# Patient Record
Sex: FEMALE | Race: WHITE | NOT HISPANIC OR LATINO | Employment: UNEMPLOYED | URBAN - NONMETROPOLITAN AREA
[De-identification: names, ages, dates, MRNs, and addresses within clinical notes are randomized per-mention and may not be internally consistent; named-entity substitution may affect disease eponyms.]

---

## 2019-03-21 ENCOUNTER — APPOINTMENT (OUTPATIENT)
Dept: GENERAL RADIOLOGY | Facility: HOSPITAL | Age: 74
End: 2019-03-21

## 2019-03-21 ENCOUNTER — APPOINTMENT (OUTPATIENT)
Dept: CT IMAGING | Facility: HOSPITAL | Age: 74
End: 2019-03-21

## 2019-03-21 ENCOUNTER — HOSPITAL ENCOUNTER (EMERGENCY)
Facility: HOSPITAL | Age: 74
Discharge: HOME | End: 2019-03-21
Attending: EMERGENCY MEDICINE | Admitting: EMERGENCY MEDICINE

## 2019-03-21 VITALS
RESPIRATION RATE: 16 BRPM | DIASTOLIC BLOOD PRESSURE: 62 MMHG | WEIGHT: 168 LBS | OXYGEN SATURATION: 96 % | HEIGHT: 63 IN | SYSTOLIC BLOOD PRESSURE: 103 MMHG | TEMPERATURE: 97.4 F | HEART RATE: 79 BPM | BODY MASS INDEX: 29.77 KG/M2

## 2019-03-21 DIAGNOSIS — N39.0 ACUTE LOWER UTI (URINARY TRACT INFECTION): ICD-10-CM

## 2019-03-21 DIAGNOSIS — I95.1 SYNCOPE DUE TO ORTHOSTATIC HYPOTENSION: Primary | ICD-10-CM

## 2019-03-21 LAB
ALBUMIN SERPL-MCNC: 4 G/DL (ref 3.5–5)
ALBUMIN/GLOB SERPL: 1.6 G/DL (ref 1–2)
ALP SERPL-CCNC: 49 U/L (ref 38–126)
ALT SERPL W P-5'-P-CCNC: 24 U/L (ref 13–69)
ANION GAP SERPL CALCULATED.3IONS-SCNC: 10.9 MMOL/L (ref 10–20)
AST SERPL-CCNC: 19 U/L (ref 15–46)
BACTERIA UR QL AUTO: ABNORMAL /HPF
BASOPHILS # BLD AUTO: 0.07 10*3/MM3 (ref 0–0.2)
BASOPHILS NFR BLD AUTO: 1 % (ref 0–2.5)
BILIRUB SERPL-MCNC: 0.3 MG/DL (ref 0.2–1.3)
BILIRUB UR QL STRIP: NEGATIVE
BUN BLD-MCNC: 22 MG/DL (ref 7–20)
BUN/CREAT SERPL: 12.2 (ref 7.1–23.5)
CALCIUM SPEC-SCNC: 9.3 MG/DL (ref 8.4–10.2)
CHLORIDE SERPL-SCNC: 100 MMOL/L (ref 98–107)
CLARITY UR: CLEAR
CO2 SERPL-SCNC: 27 MMOL/L (ref 26–30)
COLOR UR: YELLOW
CREAT BLD-MCNC: 1.8 MG/DL (ref 0.6–1.3)
DEPRECATED RDW RBC AUTO: 47.5 FL (ref 37–54)
EOSINOPHIL # BLD AUTO: 0.26 10*3/MM3 (ref 0–0.7)
EOSINOPHIL NFR BLD AUTO: 3.8 % (ref 0–7)
ERYTHROCYTE [DISTWIDTH] IN BLOOD BY AUTOMATED COUNT: 13.6 % (ref 11.5–14.5)
GFR SERPL CREATININE-BSD FRML MDRD: 28 ML/MIN/1.73
GLOBULIN UR ELPH-MCNC: 2.5 GM/DL
GLUCOSE BLD-MCNC: 157 MG/DL (ref 74–98)
GLUCOSE UR STRIP-MCNC: NEGATIVE MG/DL
HCT VFR BLD AUTO: 39.8 % (ref 37–47)
HGB BLD-MCNC: 13.1 G/DL (ref 12–16)
HGB UR QL STRIP.AUTO: NEGATIVE
HYALINE CASTS UR QL AUTO: ABNORMAL /LPF
IMM GRANULOCYTES # BLD AUTO: 0.04 10*3/MM3 (ref 0–0.06)
IMM GRANULOCYTES NFR BLD AUTO: 0.6 % (ref 0–0.6)
KETONES UR QL STRIP: NEGATIVE
LEUKOCYTE ESTERASE UR QL STRIP.AUTO: ABNORMAL
LIPASE SERPL-CCNC: 188 U/L (ref 23–300)
LYMPHOCYTES # BLD AUTO: 1.12 10*3/MM3 (ref 0.6–3.4)
LYMPHOCYTES NFR BLD AUTO: 16.5 % (ref 10–50)
MCH RBC QN AUTO: 31.2 PG (ref 27–31)
MCHC RBC AUTO-ENTMCNC: 32.9 G/DL (ref 30–37)
MCV RBC AUTO: 94.8 FL (ref 81–99)
MONOCYTES # BLD AUTO: 0.71 10*3/MM3 (ref 0–0.9)
MONOCYTES NFR BLD AUTO: 10.5 % (ref 0–12)
NEUTROPHILS # BLD AUTO: 4.58 10*3/MM3 (ref 2–6.9)
NEUTROPHILS NFR BLD AUTO: 67.6 % (ref 37–80)
NITRITE UR QL STRIP: NEGATIVE
NRBC BLD AUTO-RTO: 0 /100 WBC (ref 0–0)
PH UR STRIP.AUTO: 5.5 [PH] (ref 5–8)
PLATELET # BLD AUTO: 226 10*3/MM3 (ref 130–400)
PMV BLD AUTO: 9.2 FL (ref 6–12)
POTASSIUM BLD-SCNC: 3.9 MMOL/L (ref 3.5–5.1)
PROT SERPL-MCNC: 6.5 G/DL (ref 6.3–8.2)
PROT UR QL STRIP: NEGATIVE
RBC # BLD AUTO: 4.2 10*6/MM3 (ref 4.2–5.4)
RBC # UR: ABNORMAL /HPF
REF LAB TEST METHOD: ABNORMAL
SODIUM BLD-SCNC: 134 MMOL/L (ref 137–145)
SP GR UR STRIP: 1.02 (ref 1–1.03)
SQUAMOUS #/AREA URNS HPF: ABNORMAL /HPF
TROPONIN I SERPL-MCNC: <0.012 NG/ML (ref 0–0.03)
TSH SERPL DL<=0.05 MIU/L-ACNC: 5.55 MIU/ML (ref 0.47–4.68)
UROBILINOGEN UR QL STRIP: ABNORMAL
WBC NRBC COR # BLD: 6.78 10*3/MM3 (ref 4.8–10.8)
WBC UR QL AUTO: ABNORMAL /HPF

## 2019-03-21 PROCEDURE — 99284 EMERGENCY DEPT VISIT MOD MDM: CPT

## 2019-03-21 PROCEDURE — 80053 COMPREHEN METABOLIC PANEL: CPT | Performed by: PHYSICIAN ASSISTANT

## 2019-03-21 PROCEDURE — 84484 ASSAY OF TROPONIN QUANT: CPT | Performed by: PHYSICIAN ASSISTANT

## 2019-03-21 PROCEDURE — 85025 COMPLETE CBC W/AUTO DIFF WBC: CPT | Performed by: PHYSICIAN ASSISTANT

## 2019-03-21 PROCEDURE — 93005 ELECTROCARDIOGRAM TRACING: CPT | Performed by: PHYSICIAN ASSISTANT

## 2019-03-21 PROCEDURE — 71045 X-RAY EXAM CHEST 1 VIEW: CPT

## 2019-03-21 PROCEDURE — 72125 CT NECK SPINE W/O DYE: CPT

## 2019-03-21 PROCEDURE — 87086 URINE CULTURE/COLONY COUNT: CPT | Performed by: PHYSICIAN ASSISTANT

## 2019-03-21 PROCEDURE — 81001 URINALYSIS AUTO W/SCOPE: CPT | Performed by: PHYSICIAN ASSISTANT

## 2019-03-21 PROCEDURE — 83690 ASSAY OF LIPASE: CPT | Performed by: PHYSICIAN ASSISTANT

## 2019-03-21 PROCEDURE — 70450 CT HEAD/BRAIN W/O DYE: CPT

## 2019-03-21 PROCEDURE — 96360 HYDRATION IV INFUSION INIT: CPT

## 2019-03-21 PROCEDURE — 84443 ASSAY THYROID STIM HORMONE: CPT | Performed by: PHYSICIAN ASSISTANT

## 2019-03-21 PROCEDURE — 96361 HYDRATE IV INFUSION ADD-ON: CPT

## 2019-03-21 RX ORDER — LIDOCAINE HYDROCHLORIDE 10 MG/ML
10 INJECTION, SOLUTION INFILTRATION; PERINEURAL ONCE
Status: COMPLETED | OUTPATIENT
Start: 2019-03-21 | End: 2019-03-21

## 2019-03-21 RX ORDER — CIPROFLOXACIN 250 MG/1
250 TABLET, FILM COATED ORAL DAILY
Qty: 5 TABLET | Refills: 0 | Status: SHIPPED | OUTPATIENT
Start: 2019-03-21

## 2019-03-21 RX ORDER — SODIUM CHLORIDE 9 MG/ML
125 INJECTION, SOLUTION INTRAVENOUS CONTINUOUS
Status: DISCONTINUED | OUTPATIENT
Start: 2019-03-21 | End: 2019-03-21 | Stop reason: HOSPADM

## 2019-03-21 RX ORDER — CIPROFLOXACIN 500 MG/1
250 TABLET, FILM COATED ORAL ONCE
Status: COMPLETED | OUTPATIENT
Start: 2019-03-21 | End: 2019-03-21

## 2019-03-21 RX ORDER — SODIUM CHLORIDE 0.9 % (FLUSH) 0.9 %
10 SYRINGE (ML) INJECTION AS NEEDED
Status: DISCONTINUED | OUTPATIENT
Start: 2019-03-21 | End: 2019-03-21 | Stop reason: HOSPADM

## 2019-03-21 RX ADMIN — SODIUM CHLORIDE 1000 ML: 9 INJECTION, SOLUTION INTRAVENOUS at 15:49

## 2019-03-21 RX ADMIN — LIDOCAINE HYDROCHLORIDE 10 ML: 10 INJECTION, SOLUTION INFILTRATION; PERINEURAL at 16:55

## 2019-03-21 RX ADMIN — SODIUM CHLORIDE 125 ML/HR: 9 INJECTION, SOLUTION INTRAVENOUS at 15:00

## 2019-03-21 RX ADMIN — CIPROFLOXACIN HYDROCHLORIDE 250 MG: 500 TABLET, FILM COATED ORAL at 18:57

## 2019-03-21 RX ADMIN — SODIUM CHLORIDE 500 ML: 9 INJECTION, SOLUTION INTRAVENOUS at 14:37

## 2019-03-21 NOTE — ED PROVIDER NOTES
Subjective   The patient is here via EMS with reported syncopal event that happened earlier today apparently in the past hour she was with her friend she states she started to feel lightheaded and did apparently syncopize no chest pain or shortness of air no abdominal pain no vomiting patient with no reported seizure disorder, apparently she did hit her face she feels fine currently no complaints presents here for further evaluation... She has history of hypertension thyroid disease, tells me she has history of left bundle branch block followed by Dr. Chun... She apparently takes 3 different blood pressure medications we do not have those to review she is uncertain.. She did take 2 of those medications this morning        History provided by:  Patient and friend      Review of Systems   Constitutional: Negative.  Negative for chills and fever.   HENT: Negative.  Negative for trouble swallowing.         Lip laceration   Eyes: Negative.  Negative for photophobia and visual disturbance.   Respiratory: Negative.  Negative for shortness of breath.    Cardiovascular: Negative.  Negative for chest pain and leg swelling.   Gastrointestinal: Negative.  Negative for vomiting.   Genitourinary: Negative.    Musculoskeletal: Positive for arthralgias.   Skin: Negative.    Neurological: Positive for dizziness and syncope.   Psychiatric/Behavioral: Negative.    All other systems reviewed and are negative.      Past Medical History:   Diagnosis Date   • Disease of thyroid gland    • Hypertension        Allergies   Allergen Reactions   • Penicillins Hives       Past Surgical History:   Procedure Laterality Date   • TONSILLECTOMY     • TUBAL ABDOMINAL LIGATION         History reviewed. No pertinent family history.    Social History     Socioeconomic History   • Marital status:      Spouse name: Not on file   • Number of children: Not on file   • Years of education: Not on file   • Highest education level: Not on file   Tobacco  Use   • Smoking status: Never Smoker   Substance and Sexual Activity   • Alcohol use: Yes     Frequency: Never     Comment: occasionally   • Drug use: No           Objective   Physical Exam   Constitutional: She is oriented to person, place, and time. She appears well-developed and well-nourished. No distress.   Afebrile nontoxic no acute distress cheerful answers questions appropriately   HENT:   Head: Normocephalic.   Mouth/Throat: Oropharynx is clear and moist.   Small laceration upper mucosal aspect of lip... Does not cross vermilion border.. approx half centimeter   Eyes: Conjunctivae and EOM are normal. Pupils are equal, round, and reactive to light.   Neck: Neck supple. No tracheal deviation present.   Very minimal tenderness paraspinous C6-C7   Cardiovascular: Normal rate, regular rhythm and intact distal pulses.   Pulmonary/Chest: Effort normal and breath sounds normal.   Abdominal: Soft. There is no tenderness.   Musculoskeletal: Normal range of motion.   Pelvis and hips are nontender full range of motion all extremities... No appreciable T-spine or L-spine tenderness   Lymphadenopathy:     She has no cervical adenopathy.   Neurological: She is alert and oriented to person, place, and time. She has normal strength. No cranial nerve deficit or sensory deficit. She exhibits normal muscle tone. Coordination normal. GCS eye subscore is 4. GCS verbal subscore is 5. GCS motor subscore is 6.   Skin: Skin is warm and dry. Capillary refill takes less than 2 seconds. She is not diaphoretic.   Psychiatric: She has a normal mood and affect. Her behavior is normal. Judgment and thought content normal.   Vitals reviewed.      Laceration Repair  Date/Time: 3/21/2019 5:07 PM  Performed by: Benjamin Reddy PA-C  Authorized by: Piotr Zaidi MD     Consent:     Consent obtained:  Verbal    Consent given by:  Patient  Anesthesia (see MAR for exact dosages):     Anesthesia method:  Local infiltration    Local  anesthetic:  Lidocaine 1% w/o epi  Laceration details:     Location:  Lip    Lip location:  Upper interior lip    Length (cm):  0.5    Depth (mm):  0.5  Repair type:     Repair type:  Simple  Pre-procedure details:     Preparation:  Patient was prepped and draped in usual sterile fashion  Exploration:     Contaminated: no    Treatment:     Irrigation solution:  Tap water    Visualized foreign bodies/material removed: no    Skin repair:     Repair method:  Sutures    Suture size:  6-0    Suture material:  Chromic gut    Number of sutures:  3  Approximation:     Approximation:  Close  Post-procedure details:     Dressing:  Open (no dressing)                 ED Course  ED Course as of Mar 21 2038   Thu Mar 21, 2019   1435 EKG: Interpreted by me.  Sinus rhythm with a rate of 69 and a left bundle branch block.  Does not meet sclerosis criteria.  Abnormal EKG.  [AI]   1504 Patient case and management reviewed with Dr. Zaidi  [SC]   1508 Patient sitting up talking with friend no acute distress  [SC]   1536 We will plan on continue hydrating patient monitoring, pending imaging reports  [SC]   1640 pt has been up and using the bedside commode states she feels fine she is not dizzy.... Have discussed with her feelings of keeping her overnight, for observation patient is thinking about this... She is not sure at this point if she does want to stay  I did discuss patient's case with her family physician Dr. Rossy Espinosa... Who felt patient might benefit from observation overnight if she was willing to stay if she was not willing to stay she could follow-up with her in the morning.... Patient currently resting comfortably no distress  [SC]   1804 Patient has been ambulatory around the emergency department states again she feels fine no distress no lightheadedness no dizziness no chest pain no shortness of air patient is adamant that she does want to go home... We have recommended admission but she defers this and states  she will follow-up with her PCP tomorrow morning will be with someone this evening and tomorrow to be with her at appointment have advised if she has any sudden changes or symptoms or concerns to return to emergency department she states she will do this we have further advised her not to take any further blood pressure medication this evening concern is that she possibly did take too much of her blood pressure medication earlier today, patient is agreeable with plan again defers admission we will plan on discharging home under care of friend  [SC]   1839 We will also treat for acute lower UTI  [SC]      ED Course User Index  [AI] Piotr Zaidi MD  [SC] Benjamin Reddy, EDMUND                  MDM  Number of Diagnoses or Management Options     Amount and/or Complexity of Data Reviewed  Review and summarize past medical records: yes  Discuss the patient with other providers: yes    Risk of Complications, Morbidity, and/or Mortality  Presenting problems: moderate  Diagnostic procedures: low  Management options: moderate          Final diagnoses:   Syncope due to orthostatic hypotension   Acute lower UTI (urinary tract infection)            Benjamin Reddy, EDMUND  03/21/19 2038

## 2019-03-23 LAB — BACTERIA SPEC AEROBE CULT: NO GROWTH

## 2024-11-07 ENCOUNTER — HOSPITAL ENCOUNTER (OUTPATIENT)
Facility: HOSPITAL | Age: 79
Setting detail: OBSERVATION
LOS: 1 days | Discharge: HOME | End: 2024-11-08
Attending: STUDENT IN AN ORGANIZED HEALTH CARE EDUCATION/TRAINING PROGRAM | Admitting: INTERNAL MEDICINE
Payer: MEDICARE

## 2024-11-07 ENCOUNTER — APPOINTMENT (OUTPATIENT)
Dept: GENERAL RADIOLOGY | Facility: HOSPITAL | Age: 79
End: 2024-11-07
Payer: MEDICARE

## 2024-11-07 DIAGNOSIS — R00.1 SINUS BRADYCARDIA: Primary | ICD-10-CM

## 2024-11-07 DIAGNOSIS — R55 SYNCOPE, UNSPECIFIED SYNCOPE TYPE: ICD-10-CM

## 2024-11-07 PROBLEM — I10 ESSENTIAL HYPERTENSION: Status: ACTIVE | Noted: 2024-11-07

## 2024-11-07 PROBLEM — E03.9 HYPOTHYROIDISM (ACQUIRED): Status: ACTIVE | Noted: 2024-11-07

## 2024-11-07 LAB
ALBUMIN SERPL-MCNC: 4 G/DL (ref 3.5–5.2)
ALBUMIN/GLOB SERPL: 1.5 G/DL
ALP SERPL-CCNC: 68 U/L (ref 39–117)
ALT SERPL W P-5'-P-CCNC: 13 U/L (ref 1–33)
ANION GAP SERPL CALCULATED.3IONS-SCNC: 13.2 MMOL/L (ref 5–15)
AST SERPL-CCNC: 17 U/L (ref 1–32)
BASOPHILS # BLD AUTO: 0.08 10*3/MM3 (ref 0–0.2)
BASOPHILS NFR BLD AUTO: 1.2 % (ref 0–1.5)
BILIRUB SERPL-MCNC: 0.5 MG/DL (ref 0–1.2)
BUN SERPL-MCNC: 25 MG/DL (ref 8–23)
BUN/CREAT SERPL: 24.8 (ref 7–25)
CALCIUM SPEC-SCNC: 9.5 MG/DL (ref 8.6–10.5)
CHLORIDE SERPL-SCNC: 98 MMOL/L (ref 98–107)
CO2 SERPL-SCNC: 24.8 MMOL/L (ref 22–29)
CREAT SERPL-MCNC: 1.01 MG/DL (ref 0.57–1)
DEPRECATED RDW RBC AUTO: 44.7 FL (ref 37–54)
EGFRCR SERPLBLD CKD-EPI 2021: 57.1 ML/MIN/1.73
EOSINOPHIL # BLD AUTO: 0.21 10*3/MM3 (ref 0–0.4)
EOSINOPHIL NFR BLD AUTO: 3.1 % (ref 0.3–6.2)
ERYTHROCYTE [DISTWIDTH] IN BLOOD BY AUTOMATED COUNT: 13.2 % (ref 12.3–15.4)
GLOBULIN UR ELPH-MCNC: 2.6 GM/DL
GLUCOSE SERPL-MCNC: 153 MG/DL (ref 65–99)
HCT VFR BLD AUTO: 39.9 % (ref 34–46.6)
HGB BLD-MCNC: 13.7 G/DL (ref 12–15.9)
HOLD SPECIMEN: NORMAL
HOLD SPECIMEN: NORMAL
IMM GRANULOCYTES # BLD AUTO: 0.02 10*3/MM3 (ref 0–0.05)
IMM GRANULOCYTES NFR BLD AUTO: 0.3 % (ref 0–0.5)
LYMPHOCYTES # BLD AUTO: 1.44 10*3/MM3 (ref 0.7–3.1)
LYMPHOCYTES NFR BLD AUTO: 21.1 % (ref 19.6–45.3)
MAGNESIUM SERPL-MCNC: 1.8 MG/DL (ref 1.6–2.4)
MCH RBC QN AUTO: 31.6 PG (ref 26.6–33)
MCHC RBC AUTO-ENTMCNC: 34.3 G/DL (ref 31.5–35.7)
MCV RBC AUTO: 92.1 FL (ref 79–97)
MONOCYTES # BLD AUTO: 0.65 10*3/MM3 (ref 0.1–0.9)
MONOCYTES NFR BLD AUTO: 9.5 % (ref 5–12)
NEUTROPHILS NFR BLD AUTO: 4.41 10*3/MM3 (ref 1.7–7)
NEUTROPHILS NFR BLD AUTO: 64.8 % (ref 42.7–76)
NRBC BLD AUTO-RTO: 0 /100 WBC (ref 0–0.2)
PLATELET # BLD AUTO: 301 10*3/MM3 (ref 140–450)
PMV BLD AUTO: 8.9 FL (ref 6–12)
POTASSIUM SERPL-SCNC: 3.1 MMOL/L (ref 3.5–5.2)
PROT SERPL-MCNC: 6.6 G/DL (ref 6–8.5)
RBC # BLD AUTO: 4.33 10*6/MM3 (ref 3.77–5.28)
SODIUM SERPL-SCNC: 136 MMOL/L (ref 136–145)
T4 FREE SERPL-MCNC: 1.71 NG/DL (ref 0.92–1.68)
TROPONIN T SERPL HS-MCNC: 36 NG/L
TROPONIN T SERPL HS-MCNC: 37 NG/L
TSH SERPL DL<=0.05 MIU/L-ACNC: 4.44 UIU/ML (ref 0.27–4.2)
WBC NRBC COR # BLD AUTO: 6.81 10*3/MM3 (ref 3.4–10.8)
WHOLE BLOOD HOLD COAG: NORMAL
WHOLE BLOOD HOLD SPECIMEN: NORMAL

## 2024-11-07 PROCEDURE — 84443 ASSAY THYROID STIM HORMONE: CPT | Performed by: STUDENT IN AN ORGANIZED HEALTH CARE EDUCATION/TRAINING PROGRAM

## 2024-11-07 PROCEDURE — 36415 COLL VENOUS BLD VENIPUNCTURE: CPT

## 2024-11-07 PROCEDURE — 25010000002 ENOXAPARIN PER 10 MG: Performed by: INTERNAL MEDICINE

## 2024-11-07 PROCEDURE — 99285 EMERGENCY DEPT VISIT HI MDM: CPT | Performed by: STUDENT IN AN ORGANIZED HEALTH CARE EDUCATION/TRAINING PROGRAM

## 2024-11-07 PROCEDURE — 93005 ELECTROCARDIOGRAM TRACING: CPT | Performed by: STUDENT IN AN ORGANIZED HEALTH CARE EDUCATION/TRAINING PROGRAM

## 2024-11-07 PROCEDURE — 80053 COMPREHEN METABOLIC PANEL: CPT | Performed by: STUDENT IN AN ORGANIZED HEALTH CARE EDUCATION/TRAINING PROGRAM

## 2024-11-07 PROCEDURE — 96372 THER/PROPH/DIAG INJ SC/IM: CPT

## 2024-11-07 PROCEDURE — 71045 X-RAY EXAM CHEST 1 VIEW: CPT

## 2024-11-07 PROCEDURE — 84484 ASSAY OF TROPONIN QUANT: CPT | Performed by: STUDENT IN AN ORGANIZED HEALTH CARE EDUCATION/TRAINING PROGRAM

## 2024-11-07 PROCEDURE — 99222 1ST HOSP IP/OBS MODERATE 55: CPT | Performed by: INTERNAL MEDICINE

## 2024-11-07 PROCEDURE — 84439 ASSAY OF FREE THYROXINE: CPT | Performed by: STUDENT IN AN ORGANIZED HEALTH CARE EDUCATION/TRAINING PROGRAM

## 2024-11-07 PROCEDURE — 85025 COMPLETE CBC W/AUTO DIFF WBC: CPT | Performed by: STUDENT IN AN ORGANIZED HEALTH CARE EDUCATION/TRAINING PROGRAM

## 2024-11-07 PROCEDURE — 83735 ASSAY OF MAGNESIUM: CPT | Performed by: STUDENT IN AN ORGANIZED HEALTH CARE EDUCATION/TRAINING PROGRAM

## 2024-11-07 RX ORDER — HYDROCHLOROTHIAZIDE 25 MG/1
25 TABLET ORAL DAILY
COMMUNITY
Start: 2024-08-08

## 2024-11-07 RX ORDER — SODIUM CHLORIDE 0.9 % (FLUSH) 0.9 %
10 SYRINGE (ML) INJECTION EVERY 12 HOURS SCHEDULED
Status: DISCONTINUED | OUTPATIENT
Start: 2024-11-07 | End: 2024-11-08 | Stop reason: HOSPADM

## 2024-11-07 RX ORDER — ACETAMINOPHEN 325 MG/1
650 TABLET ORAL EVERY 4 HOURS PRN
Status: DISCONTINUED | OUTPATIENT
Start: 2024-11-07 | End: 2024-11-08 | Stop reason: HOSPADM

## 2024-11-07 RX ORDER — LEVOTHYROXINE SODIUM 50 UG/1
50 TABLET ORAL
Status: DISCONTINUED | OUTPATIENT
Start: 2024-11-08 | End: 2024-11-08 | Stop reason: HOSPADM

## 2024-11-07 RX ORDER — SODIUM CHLORIDE 0.9 % (FLUSH) 0.9 %
10 SYRINGE (ML) INJECTION AS NEEDED
Status: DISCONTINUED | OUTPATIENT
Start: 2024-11-07 | End: 2024-11-08 | Stop reason: HOSPADM

## 2024-11-07 RX ORDER — ENOXAPARIN SODIUM 100 MG/ML
40 INJECTION SUBCUTANEOUS NIGHTLY
Status: DISCONTINUED | OUTPATIENT
Start: 2024-11-07 | End: 2024-11-08 | Stop reason: HOSPADM

## 2024-11-07 RX ORDER — SODIUM CHLORIDE 9 MG/ML
40 INJECTION, SOLUTION INTRAVENOUS AS NEEDED
Status: DISCONTINUED | OUTPATIENT
Start: 2024-11-07 | End: 2024-11-08 | Stop reason: HOSPADM

## 2024-11-07 RX ORDER — HYDROCHLOROTHIAZIDE 25 MG/1
25 TABLET ORAL DAILY
Status: DISCONTINUED | OUTPATIENT
Start: 2024-11-08 | End: 2024-11-08 | Stop reason: HOSPADM

## 2024-11-07 RX ORDER — ACETAMINOPHEN 650 MG/1
650 SUPPOSITORY RECTAL EVERY 4 HOURS PRN
Status: DISCONTINUED | OUTPATIENT
Start: 2024-11-07 | End: 2024-11-08 | Stop reason: HOSPADM

## 2024-11-07 RX ORDER — ONDANSETRON 2 MG/ML
4 INJECTION INTRAMUSCULAR; INTRAVENOUS EVERY 6 HOURS PRN
Status: DISCONTINUED | OUTPATIENT
Start: 2024-11-07 | End: 2024-11-08 | Stop reason: HOSPADM

## 2024-11-07 RX ORDER — ATORVASTATIN CALCIUM 40 MG/1
40 TABLET, FILM COATED ORAL NIGHTLY
Status: DISCONTINUED | OUTPATIENT
Start: 2024-11-07 | End: 2024-11-08 | Stop reason: HOSPADM

## 2024-11-07 RX ORDER — LEVOTHYROXINE SODIUM 50 UG/1
50 TABLET ORAL
COMMUNITY
Start: 2024-10-29

## 2024-11-07 RX ORDER — POTASSIUM CHLORIDE 1500 MG/1
40 TABLET, EXTENDED RELEASE ORAL ONCE
Status: COMPLETED | OUTPATIENT
Start: 2024-11-07 | End: 2024-11-07

## 2024-11-07 RX ORDER — ACETAMINOPHEN 160 MG/5ML
650 SOLUTION ORAL EVERY 4 HOURS PRN
Status: DISCONTINUED | OUTPATIENT
Start: 2024-11-07 | End: 2024-11-08 | Stop reason: HOSPADM

## 2024-11-07 RX ORDER — CARVEDILOL 12.5 MG/1
12.5 TABLET ORAL 2 TIMES DAILY WITH MEALS
COMMUNITY
Start: 2024-07-11 | End: 2024-11-08 | Stop reason: HOSPADM

## 2024-11-07 RX ORDER — ATORVASTATIN CALCIUM 40 MG/1
40 TABLET, FILM COATED ORAL NIGHTLY
COMMUNITY
Start: 2024-10-01

## 2024-11-07 RX ADMIN — ENOXAPARIN SODIUM 40 MG: 100 INJECTION SUBCUTANEOUS at 23:15

## 2024-11-07 RX ADMIN — POTASSIUM CHLORIDE 40 MEQ: 1500 TABLET, EXTENDED RELEASE ORAL at 12:32

## 2024-11-07 RX ADMIN — ATORVASTATIN CALCIUM 40 MG: 40 TABLET, FILM COATED ORAL at 23:15

## 2024-11-07 NOTE — H&P
[facility]   HISTORY AND PHYSICAL      Name:  Ashley Emmanuel   Age:  78 y.o.  Sex:  female  :  1945  MRN:  [id number]   Visit Number:  [id number]  Admission Date:  2024  Date Of Service:  24  Primary Care Physician:  Rossy Espinosa MD    Chief Complaint:     Syncope.    History Of Presenting Illness:      Ashley Emmanuel is a 78-year-old female with history of hypothyroidism, hypertension was brought to the emergency room by EMS after an episode of syncope.  Patient was at a meeting of [employer] with she developed acute onset of dizziness and sweating and subsequently passed out.  She lost consciousness for a few seconds.  When the EMS arrived, she was noted to be bradycardic.  She felt nauseous during the episode but subsequently did not have any further symptoms.  She denies any prior history of recent syncope but did have a syncope several years ago and no abnormalities were found.  She lives with her son and is independent in daily activities including driving.  No recent nausea, vomiting or diarrhea.  No history of any chest pain.    In the emergency room, she was afebrile but was noted to have a heart rate of 49, blood pressure 108/60 and pulse oxygen saturation of 94% on room air.  Initial troponin 37 with repeat at 36.  CMP was unremarkable except for potassium of 3.1, BUN 25, creatinine 1.01, glucose 153.  LFT was unremarkable.  TSH was mildly elevated at 4.44 but free T4 was also elevated mildly at 1.71.  CBC was unremarkable.  Chest x-ray was unremarkable.  EKG showed chronic left bundle branch block with sinus bradycardia.  Patient was given potassium chloride 40 mg orally and was subsequently admitted to the medical floor with telemetry for management of symptomatic bradycardia likely related to use of carvedilol.    Review Of Systems:    All systems were reviewed and negative except as mentioned in history of presenting illness, assessment and  "plan.    Past Medical History: Patient's  has a past medical history of Disease of thyroid gland and Hypertension.    Past Surgical History: Patient's  has a past surgical history that includes Tubal ligation and Tonsillectomy.    Social History: Patient's  reports that she has never smoked. She does not have any smokeless tobacco history on file. She reports current alcohol use. She reports that she does not use drugs.    Family History:  Patient denies any family history of pacemakers.    Allergies:      Penicillins    Home Medications:    Prior to Admission Medications       Prescriptions Last Dose Informant Patient Reported? Taking?    atorvastatin (LIPITOR) 40 MG tablet   Yes Yes    Take 1 tablet by mouth Every Night.    carvedilol (COREG) 12.5 MG tablet   Yes Yes    Take 1 tablet by mouth 2 (Two) Times a Day With Meals.    hydroCHLOROthiazide 25 MG tablet   Yes Yes    Take 1 tablet by mouth Daily.    levothyroxine (SYNTHROID, LEVOTHROID) 50 MCG tablet   Yes Yes    Take 1 tablet by mouth Every Morning.    ciprofloxacin (CIPRO) 250 MG tablet   No No    Take 1 tablet by mouth Daily.     ED Medications:    Medications   sodium chloride 0.9 % flush 10 mL (has no administration in time range)   potassium chloride (KLOR-CON M20) CR tablet 40 mEq (40 mEq Oral Given 11/7/24 1232)     Vital Signs:  Temp:  [97.4 °F (36.3 °C)] 97.4 °F (36.3 °C)  Heart Rate:  [42-61] 61  Resp:  [16] 16  BP: (108-125)/(51-72) 125/72        11/07/24  1121   Weight: 68 kg (150 lb)     Body mass index is 26.57 kg/m².    Physical Exam:     Most recent vital Signs: /72   Pulse 61   Temp 97.4 °F (36.3 °C)   Resp 16   Ht 160 cm (63\")   Wt 68 kg (150 lb)   SpO2 97%   BMI 26.57 kg/m²     Physical Exam  Constitutional:       General: She is not in acute distress.     Appearance: Normal appearance. She is not ill-appearing.   HENT:      Head: Normocephalic and atraumatic.      Right Ear: External ear normal.      Left Ear: External ear " normal.      Nose: Nose normal.      Mouth/Throat:      Mouth: Mucous membranes are moist.   Eyes:      Extraocular Movements: Extraocular movements intact.      Conjunctiva/sclera: Conjunctivae normal.   Cardiovascular:      Rate and Rhythm: Regular rhythm. Bradycardia present.      Pulses: Normal pulses.      Heart sounds: Normal heart sounds. No murmur heard.  Pulmonary:      Effort: Pulmonary effort is normal.      Breath sounds: Normal breath sounds. No wheezing or rales.   Abdominal:      General: Bowel sounds are normal.      Palpations: Abdomen is soft.      Tenderness: There is no abdominal tenderness. There is no guarding or rebound.   Musculoskeletal:         General: Normal range of motion.      Cervical back: Neck supple.      Right lower leg: No edema.      Left lower leg: No edema.   Skin:     General: Skin is warm.      Findings: No erythema or rash.   Neurological:      General: No focal deficit present.      Mental Status: She is alert and oriented to person, place, and time. Mental status is at baseline.   Psychiatric:         Mood and Affect: Mood normal.         Behavior: Behavior normal.       Laboratory data:    I have reviewed the labs done in the emergency room.    Results from last 7 days   Lab Units 11/07/24  1133   SODIUM mmol/L 136   POTASSIUM mmol/L 3.1*   CHLORIDE mmol/L 98   CO2 mmol/L 24.8   BUN mg/dL 25*   CREATININE mg/dL 1.01*   CALCIUM mg/dL 9.5   BILIRUBIN mg/dL 0.5   ALK PHOS U/L 68   ALT (SGPT) U/L 13   AST (SGOT) U/L 17   GLUCOSE mg/dL 153*     Results from last 7 days   Lab Units 11/07/24  1133   WBC 10*3/mm3 6.81   HEMOGLOBIN g/dL 13.7   HEMATOCRIT % 39.9   PLATELETS 10*3/mm3 301       Results from last 7 days   Lab Units 11/07/24  1236 11/07/24  1133   HSTROP T ng/L 36* 37*     EKG:      EKG done in the emergency room was reviewed by me.  It shows sinus bradycardia at 49 bpm.  Left bundle branch block noted with associated T wave inversions.  Left bundle branch block was  also noted in the previous EKG of 2019.    Radiology:    XR Chest 1 View    Result Date: 11/7/2024  PROCEDURE: XR CHEST 1 VW-  HISTORY: Dysrhythmia Triage Protocol  COMPARISON: March 21, 2019..  FINDINGS: The heart is normal in size. The lungs are clear. The mediastinum is unremarkable. There is no pneumothorax.  There are no acute osseous abnormalities. There is evidence of old calcified granulomatous disease. Apical lordotic positioning noted. Left lateral basilar density is stable.      No acute cardiopulmonary process.     This report was signed and finalized on 11/7/2024 12:19 PM by Elizabeth Chau MD.       Assessment:    Syncope likely secondary to symptomatic bradycardia, POA.  Symptomatic bradycardia likely secondary to carvedilol use, POA.  Hypokalemia, POA.  Chronic left bundle branch block.  Essential hypertension.  Hypothyroidism.    Plan:    Syncope/symptomatic bradycardia.  - Possibly related to carvedilol use and we will hold for now.  - She may also have underlying sick sinus syndrome especially since she has had chronic left bundle branch block.  - TSH is fairly within normal range.  - He has been supplemented with potassium in the emergency room.  - Continue to monitor on telemetry.    Essential hypertension/hypothyroidism.  - Continue home medications including hydrochlorothiazide and levothyroxine but hold carvedilol.    Risk Assessment: Moderate  DVT Prophylaxis: Enoxaparin  Code Status: Full  Diet: Regular      Carlo Marcelino MD  11/07/24  15:10 EST    Dictated utilizing Dragon dictation.

## 2024-11-07 NOTE — PROGRESS NOTES
"Pharmacy Consult - Enoxaparin Dosing  Ashley Emmanuel is a 78 y.o. female who has been consulted to dose Enoxaparin for VTE PPX.     Allergies  Penicillins    Relevant clinical data and objective history reviewed:   [Ht: 160 cm (63\"); Wt: 67.3 kg (148 lb 5.9 oz)]  Body mass index is 26.28 kg/m².  Estimated Creatinine Clearance: 42.3 mL/min (A) (by C-G formula based on SCr of 1.01 mg/dL (H)).  Results from last 7 days   Lab Units 11/07/24  1133   HEMOGLOBIN g/dL 13.7   HEMATOCRIT % 39.9   PLATELETS 10*3/mm3 301   CREATININE mg/dL 1.01*       Asessment/Plan  Initiate Enoxaparin 40mg SQ every 24 hours  Pharmacy will monitor Ms. Emmanuel's renal function and clinical status and adjust the Enoxaparin dose and/or frequency as needed.    Thanks,   Laisha Rico, PharmD  11/7/2024  17:13 EST      "

## 2024-11-07 NOTE — ED NOTES
Report given to 3rd floor receiving nurse. Pt ready for transfer. Transport notified. Belongings sent with pt.

## 2024-11-08 ENCOUNTER — TELEPHONE (OUTPATIENT)
Dept: CARDIOLOGY | Facility: CLINIC | Age: 79
End: 2024-11-08
Payer: MEDICARE

## 2024-11-08 VITALS
OXYGEN SATURATION: 94 % | TEMPERATURE: 97.8 F | HEART RATE: 50 BPM | RESPIRATION RATE: 16 BRPM | BODY MASS INDEX: 26.29 KG/M2 | SYSTOLIC BLOOD PRESSURE: 132 MMHG | DIASTOLIC BLOOD PRESSURE: 64 MMHG | HEIGHT: 63 IN | WEIGHT: 148.37 LBS

## 2024-11-08 DIAGNOSIS — R55 SYNCOPE, UNSPECIFIED SYNCOPE TYPE: Primary | ICD-10-CM

## 2024-11-08 DIAGNOSIS — R00.1 BRADYCARDIA: ICD-10-CM

## 2024-11-08 LAB
ANION GAP SERPL CALCULATED.3IONS-SCNC: 8.8 MMOL/L (ref 5–15)
BUN SERPL-MCNC: 18 MG/DL (ref 8–23)
BUN/CREAT SERPL: 16.8 (ref 7–25)
CALCIUM SPEC-SCNC: 9.4 MG/DL (ref 8.6–10.5)
CHLORIDE SERPL-SCNC: 99 MMOL/L (ref 98–107)
CO2 SERPL-SCNC: 28.2 MMOL/L (ref 22–29)
CREAT SERPL-MCNC: 1.07 MG/DL (ref 0.57–1)
DEPRECATED RDW RBC AUTO: 46.5 FL (ref 37–54)
EGFRCR SERPLBLD CKD-EPI 2021: 53.3 ML/MIN/1.73
ERYTHROCYTE [DISTWIDTH] IN BLOOD BY AUTOMATED COUNT: 13.4 % (ref 12.3–15.4)
GEN 5 2HR TROPONIN T REFLEX: 35 NG/L
GLUCOSE SERPL-MCNC: 136 MG/DL (ref 65–99)
HCT VFR BLD AUTO: 41.2 % (ref 34–46.6)
HGB BLD-MCNC: 13.7 G/DL (ref 12–15.9)
MAGNESIUM SERPL-MCNC: 1.8 MG/DL (ref 1.6–2.4)
MCH RBC QN AUTO: 31.2 PG (ref 26.6–33)
MCHC RBC AUTO-ENTMCNC: 33.3 G/DL (ref 31.5–35.7)
MCV RBC AUTO: 93.8 FL (ref 79–97)
PLATELET # BLD AUTO: 299 10*3/MM3 (ref 140–450)
PMV BLD AUTO: 9.1 FL (ref 6–12)
POTASSIUM SERPL-SCNC: 3.8 MMOL/L (ref 3.5–5.2)
RBC # BLD AUTO: 4.39 10*6/MM3 (ref 3.77–5.28)
SODIUM SERPL-SCNC: 136 MMOL/L (ref 136–145)
TROPONIN T DELTA: 2 NG/L
TROPONIN T SERPL HS-MCNC: 33 NG/L
WBC NRBC COR # BLD AUTO: 6.84 10*3/MM3 (ref 3.4–10.8)

## 2024-11-08 PROCEDURE — G0378 HOSPITAL OBSERVATION PER HR: HCPCS

## 2024-11-08 PROCEDURE — 84484 ASSAY OF TROPONIN QUANT: CPT | Performed by: INTERNAL MEDICINE

## 2024-11-08 PROCEDURE — 99238 HOSP IP/OBS DSCHRG MGMT 30/<: CPT | Performed by: INTERNAL MEDICINE

## 2024-11-08 PROCEDURE — 80048 BASIC METABOLIC PNL TOTAL CA: CPT | Performed by: INTERNAL MEDICINE

## 2024-11-08 PROCEDURE — 85027 COMPLETE CBC AUTOMATED: CPT | Performed by: INTERNAL MEDICINE

## 2024-11-08 PROCEDURE — 83735 ASSAY OF MAGNESIUM: CPT | Performed by: INTERNAL MEDICINE

## 2024-11-08 RX ORDER — BLOOD-GLUCOSE METER
1 EACH MISCELLANEOUS DAILY
COMMUNITY
Start: 2024-08-07

## 2024-11-08 RX ORDER — BLOOD SUGAR DIAGNOSTIC
1 STRIP MISCELLANEOUS AS NEEDED
COMMUNITY
Start: 2024-08-07

## 2024-11-08 RX ORDER — RAMIPRIL 10 MG/1
10 CAPSULE ORAL DAILY
COMMUNITY
Start: 2024-10-01

## 2024-11-08 RX ORDER — LANCETS
1 EACH MISCELLANEOUS DAILY
COMMUNITY
Start: 2024-08-07

## 2024-11-08 RX ADMIN — HYDROCHLOROTHIAZIDE 25 MG: 25 TABLET ORAL at 09:47

## 2024-11-08 RX ADMIN — LEVOTHYROXINE SODIUM 50 MCG: 50 TABLET ORAL at 06:27

## 2024-11-08 NOTE — PLAN OF CARE
Goal Outcome Evaluation: Patient being discharged home today

## 2024-11-08 NOTE — PAYOR COMM NOTE
"TO:HUMANA  FROM:MASOOD BARRETT, RN PHONE [phone]  OBSERVATION NOTIFICATION  TAX ID [id number] NPI [id number]    Carmen Teresa (78 y.o. Female)       Date of Birth   1945    Social Security Number       [address] [address]    Home Phone   [phone]    MRN   [id number]       Yarsanism   None    Marital Status                               Admission Date   24    Admission Type   Emergency    Admitting Provider   Carlo Marcelino MD    Attending Provider   Carlo Marcelino MD    Department, Room/Bed   [facility] TELEMETRY 3, 308/1       Discharge Date       Discharge Disposition       Discharge Destination                                 Attending Provider: Carlo Marcelino MD    Allergies: Penicillins    Isolation: None   Infection: None   Code Status: CPR    Ht: 160 cm (63\")   Wt: 67.3 kg (148 lb 5.9 oz)    Admission Cmt: None   Principal Problem: Bradycardia [R00.1]                   Active Insurance as of 2024       Primary Coverage       Payor Plan Insurance Group Employer/Plan Group    HUMANA MEDICARE REPLACEMENT HUMANA MEDICARE REPLACEMENT [id number]       Payor Plan Address Payor Plan Phone Number Payor Plan Fax Number Effective Dates    [address] [address] [phone]  2018 - None Entered    [facility] [id number]         Subscriber Name Subscriber Birth Date Member ID       CARMEN TERESA 1945 [id number]                     Emergency Contacts        (Rel.) Home Phone Work Phone Mobile Phone    RUSSEL TERESA (Son) [phone] -- --                 History & Physical        Carlo Marcelino MD at [facility]            [facility] HOSPITALIST   HISTORY AND PHYSICAL      Name:  Carmen Teresa   Age:  78 y.o.  Sex:  female  :  1945  MRN:  [id number]   Visit Number:  [id number]  Admission Date:  2024  Date Of Service:  24  Primary Care Physician:  Rossy Espinosa MD    Chief " Complaint:     Syncope.    History Of Presenting Illness:      Ashley Emmanuel is a 78-year-old female with history of hypothyroidism, hypertension was brought to the emergency room by EMS after an episode of syncope.  Patient was at a meeting of [employer] with she developed acute onset of dizziness and sweating and subsequently passed out.  She lost consciousness for a few seconds.  When the EMS arrived, she was noted to be bradycardic.  She felt nauseous during the episode but subsequently did not have any further symptoms.  She denies any prior history of recent syncope but did have a syncope several years ago and no abnormalities were found.  She lives with her son and is independent in daily activities including driving.  No recent nausea, vomiting or diarrhea.  No history of any chest pain.    In the emergency room, she was afebrile but was noted to have a heart rate of 49, blood pressure 108/60 and pulse oxygen saturation of 94% on room air.  Initial troponin 37 with repeat at 36.  CMP was unremarkable except for potassium of 3.1, BUN 25, creatinine 1.01, glucose 153.  LFT was unremarkable.  TSH was mildly elevated at 4.44 but free T4 was also elevated mildly at 1.71.  CBC was unremarkable.  Chest x-ray was unremarkable.  EKG showed chronic left bundle branch block with sinus bradycardia.  Patient was given potassium chloride 40 mg orally and was subsequently admitted to the medical floor with telemetry for management of symptomatic bradycardia likely related to use of carvedilol.    Review Of Systems:    All systems were reviewed and negative except as mentioned in history of presenting illness, assessment and plan.    Past Medical History: Patient's  has a past medical history of Disease of thyroid gland and Hypertension.    Past Surgical History: Patient's  has a past surgical history that includes Tubal ligation and Tonsillectomy.    Social History: Patient's  reports that she has never smoked. She does not  "have any smokeless tobacco history on file. She reports current alcohol use. She reports that she does not use drugs.    Family History:  Patient denies any family history of pacemakers.    Allergies:      Penicillins    Home Medications:    Prior to Admission Medications       Prescriptions Last Dose Informant Patient Reported? Taking?    atorvastatin (LIPITOR) 40 MG tablet   Yes Yes    Take 1 tablet by mouth Every Night.    carvedilol (COREG) 12.5 MG tablet   Yes Yes    Take 1 tablet by mouth 2 (Two) Times a Day With Meals.    hydroCHLOROthiazide 25 MG tablet   Yes Yes    Take 1 tablet by mouth Daily.    levothyroxine (SYNTHROID, LEVOTHROID) 50 MCG tablet   Yes Yes    Take 1 tablet by mouth Every Morning.    ciprofloxacin (CIPRO) 250 MG tablet   No No    Take 1 tablet by mouth Daily.     ED Medications:    Medications   sodium chloride 0.9 % flush 10 mL (has no administration in time range)   potassium chloride (KLOR-CON M20) CR tablet 40 mEq (40 mEq Oral Given 11/7/24 1232)     Vital Signs:  Temp:  [97.4 °F (36.3 °C)] 97.4 °F (36.3 °C)  Heart Rate:  [42-61] 61  Resp:  [16] 16  BP: (108-125)/(51-72) 125/72        11/07/24  1121   Weight: 68 kg (150 lb)     Body mass index is 26.57 kg/m².    Physical Exam:     Most recent vital Signs: /72   Pulse 61   Temp 97.4 °F (36.3 °C)   Resp 16   Ht 160 cm (63\")   Wt 68 kg (150 lb)   SpO2 97%   BMI 26.57 kg/m²     Physical Exam  Constitutional:       General: She is not in acute distress.     Appearance: Normal appearance. She is not ill-appearing.   HENT:      Head: Normocephalic and atraumatic.      Right Ear: External ear normal.      Left Ear: External ear normal.      Nose: Nose normal.      Mouth/Throat:      Mouth: Mucous membranes are moist.   Eyes:      Extraocular Movements: Extraocular movements intact.      Conjunctiva/sclera: Conjunctivae normal.   Cardiovascular:      Rate and Rhythm: Regular rhythm. Bradycardia present.      Pulses: Normal pulses. "      Heart sounds: Normal heart sounds. No murmur heard.  Pulmonary:      Effort: Pulmonary effort is normal.      Breath sounds: Normal breath sounds. No wheezing or rales.   Abdominal:      General: Bowel sounds are normal.      Palpations: Abdomen is soft.      Tenderness: There is no abdominal tenderness. There is no guarding or rebound.   Musculoskeletal:         General: Normal range of motion.      Cervical back: Neck supple.      Right lower leg: No edema.      Left lower leg: No edema.   Skin:     General: Skin is warm.      Findings: No erythema or rash.   Neurological:      General: No focal deficit present.      Mental Status: She is alert and oriented to person, place, and time. Mental status is at baseline.   Psychiatric:         Mood and Affect: Mood normal.         Behavior: Behavior normal.       Laboratory data:    I have reviewed the labs done in the emergency room.    Results from last 7 days   Lab Units 11/07/24  1133   SODIUM mmol/L 136   POTASSIUM mmol/L 3.1*   CHLORIDE mmol/L 98   CO2 mmol/L 24.8   BUN mg/dL 25*   CREATININE mg/dL 1.01*   CALCIUM mg/dL 9.5   BILIRUBIN mg/dL 0.5   ALK PHOS U/L 68   ALT (SGPT) U/L 13   AST (SGOT) U/L 17   GLUCOSE mg/dL 153*     Results from last 7 days   Lab Units 11/07/24  1133   WBC 10*3/mm3 6.81   HEMOGLOBIN g/dL 13.7   HEMATOCRIT % 39.9   PLATELETS 10*3/mm3 301       Results from last 7 days   Lab Units 11/07/24  1236 11/07/24  1133   HSTROP T ng/L 36* 37*     EKG:      EKG done in the emergency room was reviewed by me.  It shows sinus bradycardia at 49 bpm.  Left bundle branch block noted with associated T wave inversions.  Left bundle branch block was also noted in the previous EKG of 2019.    Radiology:    XR Chest 1 View    Result Date: 11/7/2024  PROCEDURE: XR CHEST 1 VW-  HISTORY: Dysrhythmia Triage Protocol  COMPARISON: March 21, 2019..  FINDINGS: The heart is normal in size. The lungs are clear. The mediastinum is unremarkable. There is no  pneumothorax.  There are no acute osseous abnormalities. There is evidence of old calcified granulomatous disease. Apical lordotic positioning noted. Left lateral basilar density is stable.      No acute cardiopulmonary process.     This report was signed and finalized on 11/7/2024 12:19 PM by Elizabeth Chau MD.       Assessment:    Syncope likely secondary to symptomatic bradycardia, POA.  Symptomatic bradycardia likely secondary to carvedilol use, POA.  Hypokalemia, POA.  Chronic left bundle branch block.  Essential hypertension.  Hypothyroidism.    Plan:    Syncope/symptomatic bradycardia.  - Possibly related to carvedilol use and we will hold for now.  - She may also have underlying sick sinus syndrome especially since she has had chronic left bundle branch block.  - TSH is fairly within normal range.  - He has been supplemented with potassium in the emergency room.  - Continue to monitor on telemetry.    Essential hypertension/hypothyroidism.  - Continue home medications including hydrochlorothiazide and levothyroxine but hold carvedilol.    Risk Assessment: Moderate  DVT Prophylaxis: Enoxaparin  Code Status: Full  Diet: Regular      Carlo Marcelino MD  11/07/24  15:10 EST    Dictated utilizing Dragon dictation.    Electronically signed by Carlo Marcelino MD at 11/07/24 1710          Emergency Department Notes        Zoya Nogueira RN at 11/07/24 1539          Report given to 3rd floor receiving nurse. Pt ready for transfer. Transport notified. Belongings sent with pt.     Electronically signed by Zoya Nogueira RN at 11/07/24 1540       Dell Mario at 11/07/24 1535       Summary:Report                 3rd floor called for report of this pt. Call sent to the primary RN     Electronically signed by Dell Mario at 11/07/24 1536       Pk Kiser MD at 11/07/24 1154                   [facility] 3  Emergency Department Encounter  Emergency Medicine Physician Note       Pt Name:  Ashley Emmanuel  MRN: [id number]  Pt :   1945  Room Number:  308/1  Date of encounter:  2024  PCP: Rossy Espinosa MD  ED Provider: Pk Kiser MD    Historian: Patient      HPI:  Chief Complaint: Syncope        Context: Ashley Emmanuel is a 78 y.o. female who presents to the ED for syncope.  Patient reports she was standing and talking with friends when she felt lightheaded and dizzy and had associated nausea.  She then passed out and does not recall all of the events fully.  She was noted to be bradycardic by EMS.  Upon arrival to the emergency department she reports her symptoms have improved.  No chest pain or shortness of breath.  No leg swelling.  She cannot recall all of the medications that she is on.  She reports a similar episode happened a few years back.      PAST MEDICAL HISTORY  Past Medical History:   Diagnosis Date    Disease of thyroid gland     Hypertension          PAST SURGICAL HISTORY  Past Surgical History:   Procedure Laterality Date    TONSILLECTOMY      TUBAL ABDOMINAL LIGATION           FAMILY HISTORY  History reviewed. No pertinent family history.      SOCIAL HISTORY  Social History     Socioeconomic History    Marital status:    Tobacco Use    Smoking status: Never   Substance and Sexual Activity    Alcohol use: Yes     Comment: occasionally    Drug use: No         ALLERGIES  Penicillins        REVIEW OF SYSTEMS  Systems reviewed and negative      PHYSICAL EXAM    I have reviewed the triage vital signs and nursing notes.    ED Triage Vitals [24 1121]   Temp Heart Rate Resp BP SpO2   97.4 °F (36.3 °C) (!) 49 16 108/60 94 %      Temp src Heart Rate Source Patient Position BP Location FiO2 (%)   -- Monitor Sitting -- --       Physical Exam  Constitutional:       General: She is not in acute distress.  HENT:      Head: Atraumatic.   Eyes:      Extraocular Movements: Extraocular movements intact.      Pupils: Pupils are equal, round, and reactive to light.    Cardiovascular:      Rate and Rhythm: Regular rhythm. Bradycardia present.   Pulmonary:      Effort: Pulmonary effort is normal. No respiratory distress.      Breath sounds: No wheezing or rales.   Abdominal:      General: There is no distension.      Palpations: Abdomen is soft.   Musculoskeletal:      Cervical back: Neck supple.      Right lower leg: No edema.      Left lower leg: No edema.   Skin:     General: Skin is warm.   Neurological:      General: No focal deficit present.      Mental Status: She is alert.         LAB RESULTS  Recent Results (from the past 24 hours)   Comprehensive Metabolic Panel    Collection Time: 11/07/24 11:33 AM    Specimen: Blood   Result Value Ref Range    Glucose 153 (H) 65 - 99 mg/dL    BUN 25 (H) 8 - 23 mg/dL    Creatinine 1.01 (H) 0.57 - 1.00 mg/dL    Sodium 136 136 - 145 mmol/L    Potassium 3.1 (L) 3.5 - 5.2 mmol/L    Chloride 98 98 - 107 mmol/L    CO2 24.8 22.0 - 29.0 mmol/L    Calcium 9.5 8.6 - 10.5 mg/dL    Total Protein 6.6 6.0 - 8.5 g/dL    Albumin 4.0 3.5 - 5.2 g/dL    ALT (SGPT) 13 1 - 33 U/L    AST (SGOT) 17 1 - 32 U/L    Alkaline Phosphatase 68 39 - 117 U/L    Total Bilirubin 0.5 0.0 - 1.2 mg/dL    Globulin 2.6 gm/dL    A/G Ratio 1.5 g/dL    BUN/Creatinine Ratio 24.8 7.0 - 25.0    Anion Gap 13.2 5.0 - 15.0 mmol/L    eGFR 57.1 (L) >60.0 mL/min/1.73   Magnesium    Collection Time: 11/07/24 11:33 AM    Specimen: Blood   Result Value Ref Range    Magnesium 1.8 1.6 - 2.4 mg/dL   Single High Sensitivity Troponin T    Collection Time: 11/07/24 11:33 AM    Specimen: Blood   Result Value Ref Range    HS Troponin T 37 (H) <14 ng/L   TSH    Collection Time: 11/07/24 11:33 AM    Specimen: Blood   Result Value Ref Range    TSH 4.440 (H) 0.270 - 4.200 uIU/mL   Green Top (Gel)    Collection Time: 11/07/24 11:33 AM   Result Value Ref Range    Extra Tube Hold for add-ons.    Lavender Top    Collection Time: 11/07/24 11:33 AM   Result Value Ref Range    Extra Tube hold for add-on     Gold Top - SST    Collection Time: 11/07/24 11:33 AM   Result Value Ref Range    Extra Tube Hold for add-ons.    Light Blue Top    Collection Time: 11/07/24 11:33 AM   Result Value Ref Range    Extra Tube Hold for add-ons.    CBC Auto Differential    Collection Time: 11/07/24 11:33 AM    Specimen: Blood   Result Value Ref Range    WBC 6.81 3.40 - 10.80 10*3/mm3    RBC 4.33 3.77 - 5.28 10*6/mm3    Hemoglobin 13.7 12.0 - 15.9 g/dL    Hematocrit 39.9 34.0 - 46.6 %    MCV 92.1 79.0 - 97.0 fL    MCH 31.6 26.6 - 33.0 pg    MCHC 34.3 31.5 - 35.7 g/dL    RDW 13.2 12.3 - 15.4 %    RDW-SD 44.7 37.0 - 54.0 fl    MPV 8.9 6.0 - 12.0 fL    Platelets 301 140 - 450 10*3/mm3    Neutrophil % 64.8 42.7 - 76.0 %    Lymphocyte % 21.1 19.6 - 45.3 %    Monocyte % 9.5 5.0 - 12.0 %    Eosinophil % 3.1 0.3 - 6.2 %    Basophil % 1.2 0.0 - 1.5 %    Immature Grans % 0.3 0.0 - 0.5 %    Neutrophils, Absolute 4.41 1.70 - 7.00 10*3/mm3    Lymphocytes, Absolute 1.44 0.70 - 3.10 10*3/mm3    Monocytes, Absolute 0.65 0.10 - 0.90 10*3/mm3    Eosinophils, Absolute 0.21 0.00 - 0.40 10*3/mm3    Basophils, Absolute 0.08 0.00 - 0.20 10*3/mm3    Immature Grans, Absolute 0.02 0.00 - 0.05 10*3/mm3    nRBC 0.0 0.0 - 0.2 /100 WBC   T4, Free    Collection Time: 11/07/24 11:33 AM    Specimen: Blood   Result Value Ref Range    Free T4 1.71 (H) 0.92 - 1.68 ng/dL   Single High Sensitivity Troponin T    Collection Time: 11/07/24 12:36 PM    Specimen: Blood   Result Value Ref Range    HS Troponin T 36 (H) <14 ng/L       If labs were ordered, I independently reviewed the results and considered them in treating the patient.        RADIOLOGY  XR Chest 1 View    Result Date: 11/7/2024  PROCEDURE: XR CHEST 1 VW-  HISTORY: Dysrhythmia Triage Protocol  COMPARISON: March 21, 2019..  FINDINGS: The heart is normal in size. The lungs are clear. The mediastinum is unremarkable. There is no pneumothorax.  There are no acute osseous abnormalities. There is evidence of old calcified  granulomatous disease. Apical lordotic positioning noted. Left lateral basilar density is stable.      No acute cardiopulmonary process.     This report was signed and finalized on 11/7/2024 12:19 PM by Elizabeth Chau MD.       PROCEDURES    Procedures    ECG 12 Lead ED Triage Standing Order; Dysrhythmia   Final Result          MEDICATIONS GIVEN IN ER    Medications   sodium chloride 0.9 % flush 10 mL (has no administration in time range)   potassium chloride (KLOR-CON M20) CR tablet 40 mEq (40 mEq Oral Given 11/7/24 1232)         MEDICAL DECISION MAKING, PROGRESS, and CONSULTS    All labs, if obtained, have been independently reviewed by me.  All radiology studies, if obtained, have been reviewed by me and the radiologist dictating the report.  All EKG's, if obtained, have been independently viewed and interpreted by me.      Discussion below represents my analysis of pertinent findings related to patient's condition, differential diagnosis, treatment plan and final disposition.                         Differential diagnosis:    Syncope, arrhythmia, electrolyte abnormality, thyroid disorder, renal failure, ACS, others.      Additional sources:    - Discussed/ obtained information from independent historians:      - External (non-ED) record review: Outside ED note from 12/1/2022    - Chronic or social conditions impacting care:      - Shared decision making:        Orders placed during this visit:  Orders Placed This Encounter   Procedures    XR Chest 1 View    [facility] Draw    Comprehensive Metabolic Panel    Magnesium    Single High Sensitivity Troponin T    TSH    CBC Auto Differential    T4, Free    Single High Sensitivity Troponin T    Diet: Regular/House; Fluid Consistency: Thin (IDDSI 0)    Undress & Gown    Continuous Pulse Oximetry    Oxygen Therapy- Nasal Cannula; Titrate 1-6 LPM Per SpO2; 90 - 95%    ECG 12 Lead ED Triage Standing Order; Dysrhythmia    Insert Peripheral IV    Inpatient Admission    CBC &  Differential    Green Top (Gel)    Lavender Top    Gold Top - SST    Light Blue Top         Additional orders considered but not ordered:      ED Course/MDM Discussion:    Patient is a 78-year-old female who presented for evaluation of syncopal episode.  This occurred just prior to arrival.  On arrival to the emergency department she was bradycardic which was also noted by EMS.  Her blood pressure is mildly low.  On chart review patient is on Coreg.  EKG demonstrated sinus bradycardia with left bundle branch block.  No recent EKGs in the system.  Troponin was elevated however no significant delta change.  Electrolytes demonstrate mild hypokalemia.  Patient is on hydrochlorothiazide on chart review.  This was supplemented.  She also has history of hypothyroidism TSH was noted to be mildly elevated however T4 is appropriate.  Chest x-ray demonstrated no overt pulmonary edema on my interpretation of imaging.  She remains bradycardic here in the emergency department though she does report some symptom improvement.  Given symptomatic sinus bradycardia with elevated troponin, left bundle branch block of unknown chronicity, recommended observation admission to patient which she is agreeable to.                    Consultants:    Hospitalist    Shared Decision Making:  After my consideration of clinical presentation and any laboratory/radiology studies obtained, I discussed the findings with the patient/patient representative who is in agreement with the treatment plan and the final disposition.   Risks and benefits of discharge and/or observation/admission were discussed.       AS OF 16:10 EST VITALS:    BP - 138/68  HR - 62  TEMP - 97.3 °F (36.3 °C) (Oral)  O2 SATS - 95%                  DIAGNOSIS  Final diagnoses:   Sinus bradycardia   Syncope, unspecified syncope type         DISPOSITION  ED Disposition       ED Disposition   Decision to Admit    Condition   --    Comment   Level of Care: Telemetry [5]   Diagnosis:  Bradycardia [019026]   Certification: I Certify That Inpatient Hospital Services Are Medically Necessary For Greater Than 2 Midnights                     Please note that portions of this document were completed with voice recognition software.        Pk Kiser MD  11/07/24 1610      Electronically signed by Pk Kiser MD at 11/07/24 1610       Vital Signs (last day)       Date/Time Temp Temp src Pulse Resp BP Patient Position SpO2    11/08/24 0339 97.4 (36.3) Oral 50 16 112/60 Lying 94    11/07/24 2301 97.9 (36.6) Oral 57 16 107/62 Lying 93    11/07/24 1852 97.7 (36.5) Oral 78 16 123/62 Lying 97    11/07/24 1600 97.3 (36.3) Oral 62 16 138/68 Lying 95    11/07/24 1500 -- -- 61 -- 125/72 -- 97    11/07/24 1330 -- -- 42 -- 124/51 -- --    11/07/24 1300 -- -- 43 -- 113/60 -- --    11/07/24 1230 -- -- 46 -- 110/69 -- 95    11/07/24 1121 97.4 (36.3) -- 49 16 108/60 Sitting 94          Current Facility-Administered Medications   Medication Dose Route Frequency Provider Last Rate Last Admin    acetaminophen (TYLENOL) tablet 650 mg  650 mg Oral Q4H PRN Carlo Marcelino MD        Or    acetaminophen (TYLENOL) 160 MG/5ML oral solution 650 mg  650 mg Oral Q4H PRN Carlo Marcelino MD        Or    acetaminophen (TYLENOL) suppository 650 mg  650 mg Rectal Q4H PRN Carlo Marcelino MD        atorvastatin (LIPITOR) tablet 40 mg  40 mg Oral Nightly Carlo Marcelino MD   40 mg at 11/07/24 2315    Enoxaparin Sodium (LOVENOX) syringe 40 mg  40 mg Subcutaneous Nightly Carlo Marcelino MD   40 mg at 11/07/24 2315    hydroCHLOROthiazide tablet 25 mg  25 mg Oral Daily Carlo Marcelino MD        levothyroxine (SYNTHROID, LEVOTHROID) tablet 50 mcg  50 mcg Oral Q AM Carlo Marcelino MD   50 mcg at 11/08/24 0627    ondansetron (ZOFRAN) injection 4 mg  4 mg Intravenous Q6H PRN Carlo Marcelino MD        Pharmacy to Dose enoxaparin (LOVENOX)   Does not apply Continuous PRN Carlo Marcelino MD        sodium chloride 0.9 % flush 10 mL  10 mL Intravenous PRN Woolkitty,  MD Pk        sodium chloride 0.9 % flush 10 mL  10 mL Intravenous Q12H Carlo Marcelino MD        sodium chloride 0.9 % flush 10 mL  10 mL Intravenous PRN Carlo Marcelino MD        sodium chloride 0.9 % infusion 40 mL  40 mL Intravenous PRN Carlo Marcelino MD         Lab Results (last 24 hours)       Procedure Component Value Units Date/Time    Single High Sensitivity Troponin T [[id number]]  (Abnormal) Collected: 11/07/24 1236    Specimen: Blood Updated: 11/07/24 1259     HS Troponin T 36 ng/L     Narrative:      High Sensitive Troponin T Reference Range:  <14.0 ng/L- Negative Female for AMI  <22.0 ng/L- Negative Male for AMI  >=14 - Abnormal Female indicating possible myocardial injury.  >=22 - Abnormal Male indicating possible myocardial injury.   Clinicians would have to utilize clinical acumen, EKG, Troponin, and serial changes to determine if it is an Acute Myocardial Infarction or myocardial injury due to an underlying chronic condition.         T4, Free [[id number]]  (Abnormal) Collected: 11/07/24 1133    Specimen: Blood Updated: 11/07/24 1241     Free T4 1.71 ng/dL     Single High Sensitivity Troponin T [[id number]]  (Abnormal) Collected: 11/07/24 1133    Specimen: Blood Updated: 11/07/24 1209     HS Troponin T 37 ng/L     Narrative:      High Sensitive Troponin T Reference Range:  <14.0 ng/L- Negative Female for AMI  <22.0 ng/L- Negative Male for AMI  >=14 - Abnormal Female indicating possible myocardial injury.  >=22 - Abnormal Male indicating possible myocardial injury.   Clinicians would have to utilize clinical acumen, EKG, Troponin, and serial changes to determine if it is an Acute Myocardial Infarction or myocardial injury due to an underlying chronic condition.         TSH [[id number]]  (Abnormal) Collected: 11/07/24 1133    Specimen: Blood Updated: 11/07/24 1209     TSH 4.440 uIU/mL     Comprehensive Metabolic Panel [[id number]]  (Abnormal) Collected: 11/07/24 1133    Specimen: Blood Updated: 11/07/24 1157      Glucose 153 mg/dL      BUN 25 mg/dL      Creatinine 1.01 mg/dL      Sodium 136 mmol/L      Potassium 3.1 mmol/L      Chloride 98 mmol/L      CO2 24.8 mmol/L      Calcium 9.5 mg/dL      Total Protein 6.6 g/dL      Albumin 4.0 g/dL      ALT (SGPT) 13 U/L      AST (SGOT) 17 U/L      Alkaline Phosphatase 68 U/L      Total Bilirubin 0.5 mg/dL      Globulin 2.6 gm/dL      A/G Ratio 1.5 g/dL      BUN/Creatinine Ratio 24.8     Anion Gap 13.2 mmol/L      eGFR 57.1 mL/min/1.73     Narrative:      GFR Normal >60  Chronic Kidney Disease <60  Kidney Failure <15    The GFR formula is only valid for adults with stable renal function between ages 18 and 70.    Magnesium [[id number]]  (Normal) Collected: 11/07/24 1133    Specimen: Blood Updated: 11/07/24 1157     Magnesium 1.8 mg/dL     [facility] [[id number]] Collected: 11/07/24 1133    Specimen: Blood Updated: 11/07/24 1145    Narrative:      The following orders were created for panel order [facility].  Procedure                               Abnormality         Status                     ---------                               -----------         ------                     Green Top (Gel)[[id number]]                                  Final result               Lavender Top[[id number]]                                     Final result               Gold Top - SST[[id number]]                                   Final result               Light Blue Top[[id number]]                                   Final result                 Please view results for these tests on the individual orders.    Lavender Top [[id number]] Collected: 11/07/24 1133    Specimen: Blood Updated: 11/07/24 1145     Extra Tube hold for add-on     Comment: Auto resulted       Gold Top - SST [[id number]] Collected: 11/07/24 1133    Specimen: Blood Updated: 11/07/24 1145     Extra Tube Hold for add-ons.     Comment: Auto resulted.       Green Top (Gel) [[id number]] Collected: 11/07/24 1133    Specimen: Blood Updated:  11/07/24 1145     Extra Tube Hold for add-ons.     Comment: Auto resulted.       Light Blue Top [[id number]] Collected: 11/07/24 1133    Specimen: Blood Updated: 11/07/24 1145     Extra Tube Hold for add-ons.     Comment: Auto resulted       CBC & Differential [[id number]]  (Normal) Collected: 11/07/24 1133    Specimen: Blood Updated: 11/07/24 1139    Narrative:      The following orders were created for panel order CBC & Differential.  Procedure                               Abnormality         Status                     ---------                               -----------         ------                     CBC Auto Differential[[id number]]        Normal              Final result                 Please view results for these tests on the individual orders.    CBC Auto Differential [[id number]]  (Normal) Collected: 11/07/24 1133    Specimen: Blood Updated: 11/07/24 1139     WBC 6.81 10*3/mm3      RBC 4.33 10*6/mm3      Hemoglobin 13.7 g/dL      Hematocrit 39.9 %      MCV 92.1 fL      MCH 31.6 pg      MCHC 34.3 g/dL      RDW 13.2 %      RDW-SD 44.7 fl      MPV 8.9 fL      Platelets 301 10*3/mm3      Neutrophil % 64.8 %      Lymphocyte % 21.1 %      Monocyte % 9.5 %      Eosinophil % 3.1 %      Basophil % 1.2 %      Immature Grans % 0.3 %      Neutrophils, Absolute 4.41 10*3/mm3      Lymphocytes, Absolute 1.44 10*3/mm3      Monocytes, Absolute 0.65 10*3/mm3      Eosinophils, Absolute 0.21 10*3/mm3      Basophils, Absolute 0.08 10*3/mm3      Immature Grans, Absolute 0.02 10*3/mm3      nRBC 0.0 /100 WBC           Imaging Results (Last 24 Hours)       Procedure Component Value Units Date/Time    XR Chest 1 View [[id number]] Collected: 11/07/24 1218     Updated: 11/07/24 1221    Narrative:      PROCEDURE: XR CHEST 1 VW-     HISTORY: Dysrhythmia Triage Protocol     COMPARISON: March 21, 2019..     FINDINGS: The heart is normal in size. The lungs are clear. The  mediastinum is unremarkable. There is no pneumothorax.  There are  no  acute osseous abnormalities. There is evidence of old calcified  granulomatous disease. Apical lordotic positioning noted. Left lateral  basilar density is stable.       Impression:      No acute cardiopulmonary process.              This report was signed and finalized on 11/7/2024 12:19 PM by Elizabeth Chau MD.             Physician Progress Notes (last 24 hours)  Notes from 11/07/24 0753 through 11/08/24 0753   No notes of this type exist for this encounter.       Consult Notes (last 24 hours)  Notes from 11/07/24 0753 through 11/08/24 0753   No notes of this type exist for this encounter.

## 2024-11-08 NOTE — PLAN OF CARE
Problem: Syncope  Goal: Absence of Syncopal Symptoms  Outcome: Progressing     Problem: Fall Injury Risk  Goal: Absence of Fall and Fall-Related Injury  Outcome: Progressing     Problem: Adult Inpatient Plan of Care  Goal: Plan of Care Review  Outcome: Progressing  Goal: Patient-Specific Goal (Individualized)  Outcome: Progressing  Goal: Absence of Hospital-Acquired Illness or Injury  Outcome: Progressing  Goal: Optimal Comfort and Wellbeing  Outcome: Progressing  Goal: Readiness for Transition of Care  Outcome: Progressing     Problem: Comorbidity Management  Goal: Blood Pressure in Desired Range  Outcome: Progressing   Goal Outcome Evaluation:

## 2024-11-08 NOTE — DISCHARGE SUMMARY
[facility]   DISCHARGE SUMMARY      Name:  Ashley Emmanuel   Age:  78 y.o.  Sex:  female  :  1945  MRN:  [id number]   Visit Number:  [id number]    Admission Date:  2024  Date of Discharge:  2024  Primary Care Physician:  Rossy Espinosa MD    Important issues to note:    1.  Patient was admitted with syncope and bradycardia.  She was also noted to have chronic left bundle branch block.  Telemetry review showed intermittent sinus pauses not more than 2.3 seconds.  She remained asymptomatic during the hospital stay and her carvedilol has been discontinued.  2.  Patient has been placed on Holter monitor and will need to follow-up with Dr. Navarro, cardiology in 2 weeks.  3.  Follow-up with primary care physician in 1 week.    Discharge Diagnoses:     Syncope likely secondary to symptomatic bradycardia, POA.  Symptomatic bradycardia likely secondary to carvedilol use, POA.  Hypokalemia, POA.  Chronic left bundle branch block.  Essential hypertension.  Hypothyroidism.    Problem List:     Active Hospital Problems    Diagnosis  POA    **Bradycardia [R00.1]  Yes    Syncope [R55]  Yes     Priority: High    Essential hypertension [I10]  Yes    Hypothyroidism (acquired) [E03.9]  Yes      Resolved Hospital Problems   No resolved problems to display.     Presenting Problem:    Chief Complaint   Patient presents with    abnormal HR      Consults:     Consulting Physician(s)                     None              Procedures Performed:    None.    History of presenting illness/Hospital Course:    Ashley Emmanuel is a 78-year-old female with history of hypothyroidism, hypertension was brought to the emergency room by EMS after an episode of syncope.  Patient was at a meeting of [employer] with she developed acute onset of dizziness and sweating and subsequently passed out.  She lost consciousness for a few seconds.  When the EMS arrived, she was noted to be bradycardic.  She felt  nauseous during the episode but subsequently did not have any further symptoms.  She denies any prior history of recent syncope but did have a syncope several years ago and no abnormalities were found.  She lives with her son and is independent in daily activities including driving.  No recent nausea, vomiting or diarrhea.  No history of any chest pain.     In the emergency room, she was afebrile but was noted to have a heart rate of 49, blood pressure 108/60 and pulse oxygen saturation of 94% on room air.  Initial troponin 37 with repeat at 36.  CMP was unremarkable except for potassium of 3.1, BUN 25, creatinine 1.01, glucose 153.  LFT was unremarkable.  TSH was mildly elevated at 4.44 but free T4 was also elevated mildly at 1.71.  CBC was unremarkable.  Chest x-ray was unremarkable.  EKG showed chronic left bundle branch block with sinus bradycardia.  Patient was given potassium chloride 40 mg orally and was subsequently admitted to the medical floor with telemetry for management of symptomatic bradycardia likely related to use of carvedilol.    Patient remained hemodynamically stable with her heart rate coming up into the 50s and 60s.  She was able to walk to the bathroom without any dizziness.  Her carvedilol has been discontinued.  She may have underlying sick sinus syndrome but did not have any significant pauses on telemetry overnight.  I have recommended to discontinue carvedilol and follow-up with outpatient cardiology Dr. Navarro.  We will place Holter monitor prior to discharge.  She will need to follow-up with primary care physician in 1 week.    Syncope/symptomatic bradycardia.  - Possibly related to carvedilol use and it has been discontinued.  - She may also have underlying sick sinus syndrome especially since she has had chronic left bundle branch block.  - TSH is fairly within normal range.  - He has been supplemented with potassium in the emergency room.  - Continue to monitor on telemetry.      Essential hypertension/hypothyroidism.  - Continue home medications including hydrochlorothiazide and levothyroxine but hold carvedilol.    Vital Signs:    Temp:  [97.3 °F (36.3 °C)-98 °F (36.7 °C)] 98 °F (36.7 °C)  Heart Rate:  [42-78] 50  Resp:  [16] 16  BP: (107-138)/(51-72) 112/60    Physical Exam:    General Appearance:  Alert and cooperative.    Head:  Atraumatic and normocephalic.   Eyes: Conjunctivae and sclerae normal, no icterus. No pallor.   Ears:  Ears with no abnormalities noted.   Throat: No oral lesions, no thrush, oral mucosa moist.   Neck: Supple, trachea midline, no thyromegaly.   Back:   No kyphoscoliosis present. No tenderness to palpation.   Lungs:   Breath sounds heard bilaterally equally.  No crackles or wheezing. No Pleural rub or bronchial breathing.   Heart:  Normal S1 and S2, no murmur, no gallop, no rub. No JVD.   Abdomen:   Normal bowel sounds, no masses, no organomegaly. Soft, nontender, nondistended, no rebound tenderness.   Extremities: Supple, no edema, no cyanosis, no clubbing.   Pulses: Pulses palpable bilaterally.   Skin: No bleeding or rash.   Neurologic: Alert and oriented x 3. No facial asymmetry. Moves all four limbs. No tremors.     Pertinent Lab Results:     Results from last 7 days   Lab Units 11/08/24  0819 11/07/24  1133   SODIUM mmol/L 136 136   POTASSIUM mmol/L 3.8 3.1*   CHLORIDE mmol/L 99 98   CO2 mmol/L 28.2 24.8   BUN mg/dL 18 25*   CREATININE mg/dL 1.07* 1.01*   CALCIUM mg/dL 9.4 9.5   BILIRUBIN mg/dL  --  0.5   ALK PHOS U/L  --  68   ALT (SGPT) U/L  --  13   AST (SGOT) U/L  --  17   GLUCOSE mg/dL 136* 153*     Results from last 7 days   Lab Units 11/08/24  0818 11/07/24  1133   WBC 10*3/mm3 6.84 6.81   HEMOGLOBIN g/dL 13.7 13.7   HEMATOCRIT % 41.2 39.9   PLATELETS 10*3/mm3 299 301       Results from last 7 days   Lab Units 11/08/24  1027 11/08/24  0819 11/07/24  1236   HSTROP T ng/L 35* 33* 36*     Pertinent Radiology Results:    Imaging Results (All)        Procedure Component Value Units Date/Time    XR Chest 1 View [[id number]] Collected: 11/07/24 1218     Updated: 11/07/24 1221    Narrative:      PROCEDURE: XR CHEST 1 VW-     HISTORY: Dysrhythmia Triage Protocol     COMPARISON: March 21, 2019..     FINDINGS: The heart is normal in size. The lungs are clear. The  mediastinum is unremarkable. There is no pneumothorax.  There are no  acute osseous abnormalities. There is evidence of old calcified  granulomatous disease. Apical lordotic positioning noted. Left lateral  basilar density is stable.       Impression:      No acute cardiopulmonary process.     This report was signed and finalized on 11/7/2024 12:19 PM by Elizabeth Chau MD.        Condition on Discharge:      Stable.    Code status during the hospital stay:    Code Status and Medical Interventions: CPR (Attempt to Resuscitate); Full Support   Ordered at: 11/07/24 1711     Code Status (Patient has no pulse and is not breathing):    CPR (Attempt to Resuscitate)     Medical Interventions (Patient has pulse or is breathing):    Full Support     Discharge Disposition:    Home or Self Care    Discharge Medications:       Discharge Medications        Continue These Medications        Instructions Start Date   atorvastatin 40 MG tablet  Commonly known as: LIPITOR   40 mg, Nightly      hydroCHLOROthiazide 25 MG tablet   25 mg, Daily      levothyroxine 50 MCG tablet  Commonly known as: SYNTHROID, LEVOTHROID   50 mcg, Every Early Morning             Stop These Medications      carvedilol 12.5 MG tablet  Commonly known as: COREG            Discharge Diet:     Diet Instructions       Diet: Regular/House Diet; Regular (IDDSI 7); Thin (IDDSI 0)      Discharge Diet: Regular/House Diet    Texture: Regular (IDDSI 7)    Fluid Consistency: Thin (IDDSI 0)          Activity at Discharge:     Activity Instructions       Activity as Tolerated            Follow-up Appointments:     Follow-up Information       Victorino Navarro MD  Follow up in 2 week(s).    Specialties: Cardiology, Interventional Radiology  Contact information:  [address]  [facility] [address] [phone]               Rossy Espinosa MD Follow up in 1 week(s).    Specialty: Internal Medicine  Contact information:  [address] [place] [address] [phone]                           Test Results Pending at Discharge:    None.       Carlo Marcelino MD  11/08/24  11:36 EST    Time: I spent 25 minutes on this discharge activity which included: face-to-face encounter with the patient, reviewing the data in the system, coordination of the care with the nursing staff as well as consultants, documentation, and entering orders.     Dictated utilizing Dragon dictation.

## 2024-11-08 NOTE — TELEPHONE ENCOUNTER
Verbal Per Dr Navarro for Pt to wear monitor due to syncope. MCOT ordered due to diagnosis of syncope

## 2024-11-08 NOTE — CASE MANAGEMENT/SOCIAL WORK
Discharge Planning Assessment  [facility]     Patient Name: Ashley Emmanuel  MRN: [id number]  Today's Date: 2024    Admit Date: 2024    Plan: Pt awake and alert at time of visit.  Pt confirmed , address, PCP, telephne numbers, :  Marcelino Emmanuel/son/[phone].  Pt reports she does not have a POA or Living Will,  She lives with son in two level house and has lived there for 35yrs.  She denies having any medical equipment or HH visiting. She reports being Independent of ADL's  Pt uses [facility].  Meds to Beds explained   Pt agreeable to using at DC.  Pt plans to return home at DC.  No DC needs or services needed.  SDOH completed.   Discharge Needs Assessment       Row Name 24 1257       Living Environment    People in Home child(nicola), adult    Name(s) of People in Home Marcelino Emmanuel/son/[phone]    Current Living Arrangements home    Duration at Residence 35 years    Potentially Unsafe Housing Conditions unable to assess    In the past 12 months has the electric, gas, oil, or water company threatened to shut off services in your home? No    Primary Care Provided by self    Provides Primary Care For no one    Family Caregiver if Needed child(nicola), adult    Family Caregiver Names Son    Quality of Family Relationships unable to assess    Able to Return to Prior Arrangements yes       Resource/Environmental Concerns    Transportation Concerns none       Transportation Needs    In the past 12 months, has lack of transportation kept you from medical appointments or from getting medications? no    In the past 12 months, has lack of transportation kept you from meetings, work, or from getting things needed for daily living? No       Food Insecurity    Within the past 12 months, you worried that your food would run out before you got the money to buy more. Sometimes    Within the past 12 months, the food you bought just didn't last and you didn't have money to get more. Sometimes        Transition Planning    Patient/Family Anticipates Transition to home with family    Patient/Family Anticipated Services at Transition none    Transportation Anticipated family or friend will provide       Discharge Needs Assessment    Readmission Within the Last 30 Days no previous admission in last 30 days    Equipment Currently Used at Home none    Concerns to be Addressed denies needs/concerns at this time    Do you want help finding or keeping work or a job? I do not need or want help    Do you want help with school or training? For example, starting or completing job training or getting a high school diploma, GED or equivalent No    Anticipated Changes Related to Illness none    Equipment Needed After Discharge none    Provided Post Acute Provider List? N/A    N/A Provider List Comment Denies needs at this time                   Discharge Plan       Row Name 24 1304       Plan    Plan Pt awake and alert at time of visit.  Pt confirmed , address, PCP, telephne numbers, :  Marcelino Emmanuel/son/[phone].  Pt reports she does not have a POA or Living Will,  She lives with son in two level house and has lived there for 35yrs.  She denies having any medical equipment or HH visiting. She reports being Independent of ADL's  Pt uses [facility].  Meds to Beds explained   Pt agreeable to using at DC.  Pt plans to return home at DC.  No DC needs or services needed.  SDOH completed.                  Continued Care and Services - Admitted Since 2024    No active coordination exists for this encounter.       Expected Discharge Date and Time       Expected Discharge Date Expected Discharge Time    2024            Demographic Summary       Row Name 24 1253       General Information    Admission Type observation    Arrived From home    Required Notices Provided Observation Status Notice    Referral Source admission list    Reason for Consult discharge planning    Preferred Language  English       Contact Information    Permission Granted to Share Info With ;family/designee    Contact Information Obtained for     Contact Information Comments Marcelino Emmanuel/son/[phone]                   Functional Status       Row Name 11/08/24 1254       Functional Status    Current Activity Tolerance moderate    Functional Status Comments Walks frequently       Physical Activity    On average, how many days per week do you engage in moderate to strenuous exercise (like a brisk walk)? Pt Unable    On average, how many minutes do you engage in exercise at this level? Pt Unable       Functional Status, IADL    Medications independent    Meal Preparation independent    Housekeeping independent    Laundry independent    Shopping independent    If for any reason you need help with day-to-day activities such as bathing, preparing meals, shopping, managing finances, etc., do you get the help you need? I get all the help I need       Employment/    Employment Status unemployed                   Psychosocial    No documentation.                  Abuse/Neglect    No documentation.                  Legal    No documentation.                  Substance Abuse    No documentation.                  Patient Forms    No documentation.                     Blanca Curtis RN

## 2024-11-08 NOTE — PLAN OF CARE
Problem: Syncope  Goal: Absence of Syncopal Symptoms  11/8/2024 0314 by Aviva Bee RN  Outcome: Progressing  11/8/2024 0314 by Aviva Bee RN  Outcome: Progressing     Problem: Fall Injury Risk  Goal: Absence of Fall and Fall-Related Injury  11/8/2024 0314 by Aviva Bee RN  Outcome: Progressing  11/8/2024 0314 by Aviva Bee RN  Outcome: Progressing     Problem: Adult Inpatient Plan of Care  Goal: Plan of Care Review  11/8/2024 0314 by Aviva Bee RN  Outcome: Progressing  11/8/2024 0314 by Aviva Bee RN  Outcome: Progressing  Goal: Patient-Specific Goal (Individualized)  11/8/2024 0314 by Aviva Bee RN  Outcome: Progressing  11/8/2024 0314 by Aviva Bee RN  Outcome: Progressing  Goal: Absence of Hospital-Acquired Illness or Injury  11/8/2024 0314 by Aviva Bee RN  Outcome: Progressing  11/8/2024 0314 by Aviva Bee RN  Outcome: Progressing  Goal: Optimal Comfort and Wellbeing  11/8/2024 0314 by Aviva Bee RN  Outcome: Progressing  11/8/2024 0314 by Aviva Bee RN  Outcome: Progressing  Goal: Readiness for Transition of Care  11/8/2024 0314 by Aviva Bee RN  Outcome: Progressing  11/8/2024 0314 by Aviva Bee RN  Outcome: Progressing     Problem: Comorbidity Management  Goal: Blood Pressure in Desired Range  11/8/2024 0314 by Aviva Bee RN  Outcome: Progressing  11/8/2024 0314 by Aviva Bee RN  Outcome: Progressing   Goal Outcome Evaluation:

## 2024-11-26 ENCOUNTER — OFFICE VISIT (OUTPATIENT)
Dept: CARDIOLOGY | Facility: CLINIC | Age: 79
End: 2024-11-26
Payer: MEDICARE

## 2024-11-26 VITALS
WEIGHT: 149 LBS | DIASTOLIC BLOOD PRESSURE: 76 MMHG | HEIGHT: 63 IN | OXYGEN SATURATION: 97 % | SYSTOLIC BLOOD PRESSURE: 120 MMHG | BODY MASS INDEX: 26.4 KG/M2 | HEART RATE: 97 BPM

## 2024-11-26 DIAGNOSIS — R00.1 BRADYCARDIA: Primary | ICD-10-CM

## 2024-11-26 DIAGNOSIS — R74.8 ABNORMAL CARDIAC ENZYME LEVEL: ICD-10-CM

## 2024-11-26 DIAGNOSIS — E78.5 DYSLIPIDEMIA: ICD-10-CM

## 2024-11-26 DIAGNOSIS — I10 ESSENTIAL HYPERTENSION: ICD-10-CM

## 2024-11-26 DIAGNOSIS — R94.31 ABNORMAL ELECTROCARDIOGRAM (ECG) (EKG): ICD-10-CM

## 2024-11-26 DIAGNOSIS — R55 SYNCOPE, UNSPECIFIED SYNCOPE TYPE: ICD-10-CM

## 2024-11-26 NOTE — PROGRESS NOTES
"    Subjective:     Encounter Date:11/26/2024      Patient ID: Ashley Emmanuel is a 79 y.o. female.    Chief Complaint: Syncope  HPI  This is a 79-year-old female patient who presents to cardiology clinic for follow-up after recent hospitalization for syncope.  The patient was noted to have bradycardia on beta-blocker therapy.  Beta-blocker therapy was discontinued.  She is currently wearing an outpatient cardiac monitor.  To date, her slowest heart rate has been 42 bpm which occurred during sleeping hours.  She has had no recurrent syncope.  She has had 1 prior episode of syncope 6 to 7 years ago.  She did not have a typical \"vagal\" prodrome.  During her hospitalization the patient was noted to have a mild elevation in her cardiac troponins.  She had no chest discomfort, shortness of breath or other symptoms attributable to ischemic heart disease.  Twelve-lead electrocardiogram showed no ischemic ST-T wave changes or injury current.  She has never had an ischemic evaluation.  She has a history of hypertension and dyslipidemia.  She is a non-smoker.  She is unable to do treadmill exercise stress testing due to limited mobility, recent syncope and poor effort tolerance.  The following portions of the patient's history were reviewed and updated as appropriate: allergies, current medications, past family history, past medical history, past social history, past surgical history and problem  Review of Systems   Constitutional: Negative for chills, diaphoresis, fever, malaise/fatigue, weight gain and weight loss.   HENT:  Negative for ear discharge, hearing loss, hoarse voice and nosebleeds.    Eyes:  Negative for discharge, double vision, pain and photophobia.   Cardiovascular:  Negative for chest pain, claudication, cyanosis, dyspnea on exertion, irregular heartbeat, leg swelling, near-syncope, orthopnea, palpitations, paroxysmal nocturnal dyspnea and syncope.   Respiratory:  Negative for cough, hemoptysis, sputum " production and wheezing.    Endocrine: Negative for cold intolerance, heat intolerance, polydipsia, polyphagia and polyuria.   Hematologic/Lymphatic: Negative for adenopathy and bleeding problem. Does not bruise/bleed easily.   Skin:  Negative for color change, flushing, itching and rash.   Musculoskeletal:  Negative for muscle cramps, muscle weakness, myalgias and stiffness.   Gastrointestinal:  Negative for abdominal pain, diarrhea, hematemesis, hematochezia, nausea and vomiting.   Genitourinary:  Negative for dysuria, frequency and nocturia.   Neurological:  Negative for focal weakness, loss of balance, numbness, paresthesias and seizures.   Psychiatric/Behavioral:  Negative for altered mental status, hallucinations and suicidal ideas.    Allergic/Immunologic: Negative for HIV exposure, hives and persistent infections.           Current Outpatient Medications:     Accu-Chek Guide test strip, 1 each by Other route As Needed., Disp: , Rfl:     Accu-Chek Softclix Lancets lancets, 1 each by Other route Daily., Disp: , Rfl:     atorvastatin (LIPITOR) 40 MG tablet, Take 1 tablet by mouth Every Night., Disp: , Rfl:     Blood Glucose Monitoring Suppl (Accu-Chek Guide) w/Device kit, 1 each by Other route Daily. use to test once daily, Disp: , Rfl:     CALCIUM PO, Take  by mouth., Disp: , Rfl:     hydroCHLOROthiazide 25 MG tablet, Take 1 tablet by mouth Daily., Disp: , Rfl:     levothyroxine (SYNTHROID, LEVOTHROID) 50 MCG tablet, Take 1 tablet by mouth Every Morning., Disp: , Rfl:     ramipril (ALTACE) 10 MG capsule, Take 1 capsule by mouth Daily., Disp: , Rfl:     Objective:   Vitals and nursing note reviewed.   Constitutional:       Appearance: Healthy appearance. Not in distress.   Neck:      Vascular: No JVR. JVD normal.   Pulmonary:      Effort: Pulmonary effort is normal.      Breath sounds: Normal breath sounds. No wheezing. No rhonchi. No rales.   Chest:      Chest wall: Not tender to palpatation.   Cardiovascular:  "     PMI at left midclavicular line. Normal rate. Regular rhythm. Normal S1. Normal S2.       Murmurs: There is no murmur.      No gallop.  No click. No rub.   Pulses:     Intact distal pulses.   Edema:     Peripheral edema absent.   Abdominal:      General: Bowel sounds are normal.      Palpations: Abdomen is soft.      Tenderness: There is no abdominal tenderness.   Musculoskeletal: Normal range of motion.         General: No tenderness. Skin:     General: Skin is warm and dry.   Neurological:      General: No focal deficit present.      Mental Status: Alert and oriented to person, place and time.       Blood pressure 120/76, pulse 97, height 160 cm (63\"), weight 67.6 kg (149 lb), SpO2 97%.   Lab Review:     Assessment:       1. Bradycardia  Iatrogenic (beta-blocker) worsening of mild underlying sinus node dysfunction.    2. Essential hypertension  Acceptable blood pressure control.    3. Syncope, unspecified syncope type  Most likely iatrogenic from beta-blocker induced bradycardia.  No recurrence.    4. Abnormal cardiac enzyme level  Possible ischemic heart disease.    5. Dyslipidemia  Tolerating statin based cholesterol-lowering therapy without side effects.    Procedures    Plan:     Advance Care Planning   ACP discussion was held with the patient during this visit. Patient has an advance directive (not in EMR), copy requested.     I have recommended a vasodilator nuclear stress test utilizing a 2-day imaging protocol to help minimize for attenuation artifact which is anticipated given her body habitus.    I have recommended an echocardiogram.    The patient is encouraged to continue wearing her cardiac monitor through the first week of December.    No changes in medications made at today's visit.    The patient is reeducated regarding the importance of a 3-month driving restriction.  She has been advised that she should not drive or operate motor vehicles until she has been at least 3 months \"syncope-Free\".  " Self protection measures have been discussed.

## 2024-12-10 LAB
CV ZIO BASELINE AVG BPM: 71
CV ZIO BASELINE BPM HIGH: 140
CV ZIO BASELINE BPM LOW: 42
Lab: 32
TOAL ENROLLMENT DAYS: 30

## 2025-01-07 ENCOUNTER — HOSPITAL ENCOUNTER (OUTPATIENT)
Dept: NUCLEAR MEDICINE | Facility: HOSPITAL | Age: 80
Discharge: HOME | End: 2025-01-07
Payer: MEDICARE

## 2025-01-07 PROCEDURE — 93017 CV STRESS TEST TRACING ONLY: CPT

## 2025-01-07 PROCEDURE — 25010000002 REGADENOSON 0.4 MG/5ML SOLUTION: Performed by: INTERNAL MEDICINE

## 2025-01-07 PROCEDURE — 34310000005 TECHNETIUM SESTAMIBI: Performed by: INTERNAL MEDICINE

## 2025-01-07 PROCEDURE — 78452 HT MUSCLE IMAGE SPECT MULT: CPT

## 2025-01-07 PROCEDURE — A9500 TC99M SESTAMIBI: HCPCS | Performed by: INTERNAL MEDICINE

## 2025-01-07 RX ADMIN — TECHNETIUM TC 99M SESTAMIBI 1 DOSE: 1 INJECTION INTRAVENOUS at 09:17

## 2025-01-07 RX ADMIN — REGADENOSON 0.4 MG: 0.08 INJECTION, SOLUTION INTRAVENOUS at 17:00

## 2025-01-08 ENCOUNTER — HOSPITAL ENCOUNTER (OUTPATIENT)
Dept: NUCLEAR MEDICINE | Facility: HOSPITAL | Age: 80
Discharge: HOME | End: 2025-01-08
Payer: MEDICARE

## 2025-01-08 ENCOUNTER — APPOINTMENT (OUTPATIENT)
Dept: NUCLEAR MEDICINE | Facility: HOSPITAL | Age: 80
End: 2025-01-08
Payer: MEDICARE

## 2025-01-08 PROCEDURE — 34310000005 TECHNETIUM SESTAMIBI: Performed by: INTERNAL MEDICINE

## 2025-01-08 PROCEDURE — A9500 TC99M SESTAMIBI: HCPCS | Performed by: INTERNAL MEDICINE

## 2025-01-08 RX ORDER — REGADENOSON 0.08 MG/ML
0.4 INJECTION, SOLUTION INTRAVENOUS
Status: COMPLETED | OUTPATIENT
Start: 2025-01-07 | End: 2025-01-07

## 2025-01-08 RX ADMIN — TECHNETIUM TC 99M SESTAMIBI 1 DOSE: 1 INJECTION INTRAVENOUS at 06:35

## 2025-01-10 LAB
BH CV REST NUCLEAR ISOTOPE DOSE: 33.9 MCI
BH CV STRESS COMMENTS STAGE 1: NORMAL
BH CV STRESS DOSE REGADENOSON STAGE 1: 0.4
BH CV STRESS DURATION MIN STAGE 1: 0
BH CV STRESS DURATION SEC STAGE 1: 10
BH CV STRESS NUCLEAR ISOTOPE DOSE: 33.4 MCI
BH CV STRESS PROTOCOL 1: NORMAL
BH CV STRESS RECOVERY BP: NORMAL MMHG
BH CV STRESS RECOVERY HR: 104 BPM
BH CV STRESS STAGE 1: 1
MAXIMAL PREDICTED HEART RATE: 141 BPM
PERCENT MAX PREDICTED HR: 89.36 %
SPECT HRT GATED+EF W RNC IV: 90 %
STRESS BASELINE BP: NORMAL MMHG
STRESS BASELINE HR: 78 BPM
STRESS PERCENT HR: 105 %
STRESS POST PEAK BP: NORMAL MMHG
STRESS POST PEAK HR: 126 BPM
STRESS TARGET HR: 120 BPM

## 2025-02-07 LAB
AV MEAN PRESS GRAD SYS DOP V1V2: 5 MMHG
AV VMAX SYS DOP: 143 CM/SEC
BH CV ECHO MEAS - AO MAX PG: 8.2 MMHG
BH CV ECHO MEAS - AO ROOT DIAM: 2.9 CM
BH CV ECHO MEAS - AO V2 VTI: 28.5 CM
BH CV ECHO MEAS - AVA(I,D): 3.5 CM2
BH CV ECHO MEAS - EDV(CUBED): 23.6 ML
BH CV ECHO MEAS - EDV(MOD-SP2): 46.5 ML
BH CV ECHO MEAS - EDV(MOD-SP4): 40.4 ML
BH CV ECHO MEAS - EF(MOD-SP2): 66 %
BH CV ECHO MEAS - EF(MOD-SP4): 64.6 %
BH CV ECHO MEAS - ESV(CUBED): 6.3 ML
BH CV ECHO MEAS - ESV(MOD-SP2): 15.8 ML
BH CV ECHO MEAS - ESV(MOD-SP4): 14.3 ML
BH CV ECHO MEAS - FS: 35.5 %
BH CV ECHO MEAS - IVS/LVPW: 1 CM
BH CV ECHO MEAS - IVSD: 1.14 CM
BH CV ECHO MEAS - LA DIMENSION: 2.4 CM
BH CV ECHO MEAS - LAT PEAK E' VEL: 7.2 CM/SEC
BH CV ECHO MEAS - LV DIASTOLIC VOL/BSA (35-75): 23.7 CM2
BH CV ECHO MEAS - LV MASS(C)D: 94.6 GRAMS
BH CV ECHO MEAS - LV MAX PG: 6.5 MMHG
BH CV ECHO MEAS - LV MEAN PG: 4 MMHG
BH CV ECHO MEAS - LV SYSTOLIC VOL/BSA (12-30): 8.4 CM2
BH CV ECHO MEAS - LV V1 MAX: 127 CM/SEC
BH CV ECHO MEAS - LV V1 VTI: 26 CM
BH CV ECHO MEAS - LVIDD: 2.9 CM
BH CV ECHO MEAS - LVIDS: 1.85 CM
BH CV ECHO MEAS - LVOT AREA: 3.8 CM2
BH CV ECHO MEAS - LVOT DIAM: 2.2 CM
BH CV ECHO MEAS - LVPWD: 1.14 CM
BH CV ECHO MEAS - MED PEAK E' VEL: 6.7 CM/SEC
BH CV ECHO MEAS - MV A MAX VEL: 92.8 CM/SEC
BH CV ECHO MEAS - MV DEC SLOPE: 172.5 CM/SEC2
BH CV ECHO MEAS - MV DEC TIME: 0.34 SEC
BH CV ECHO MEAS - MV E MAX VEL: 58.3 CM/SEC
BH CV ECHO MEAS - MV E/A: 0.63
BH CV ECHO MEAS - MV MAX PG: 3.5 MMHG
BH CV ECHO MEAS - MV MEAN PG: 1 MMHG
BH CV ECHO MEAS - MV P1/2T: 110.4 MSEC
BH CV ECHO MEAS - MV V2 VTI: 19.5 CM
BH CV ECHO MEAS - MVA(P1/2T): 1.99 CM2
BH CV ECHO MEAS - MVA(VTI): 5.1 CM2
BH CV ECHO MEAS - PA V2 MAX: 89.3 CM/SEC
BH CV ECHO MEAS - RAP SYSTOLE: 3 MMHG
BH CV ECHO MEAS - RVSP: 20.5 MMHG
BH CV ECHO MEAS - SV(LVOT): 98.8 ML
BH CV ECHO MEAS - SV(MOD-SP2): 30.7 ML
BH CV ECHO MEAS - SV(MOD-SP4): 26.1 ML
BH CV ECHO MEAS - SVI(LVOT): 57.9 ML/M2
BH CV ECHO MEAS - SVI(MOD-SP2): 18 ML/M2
BH CV ECHO MEAS - SVI(MOD-SP4): 15.3 ML/M2
BH CV ECHO MEAS - TAPSE (>1.6): 1.64 CM
BH CV ECHO MEAS - TR MAX PG: 17.5 MMHG
BH CV ECHO MEAS - TR MAX VEL: 208.5 CM/SEC
BH CV ECHO MEASUREMENTS AVERAGE E/E' RATIO: 8.39
LV EF BIPLANE MOD: 65.9 %

## 2025-03-03 ENCOUNTER — OFFICE VISIT (OUTPATIENT)
Dept: CARDIOLOGY | Facility: CLINIC | Age: 80
End: 2025-03-03
Payer: MEDICARE

## 2025-03-03 VITALS
BODY MASS INDEX: 26.05 KG/M2 | WEIGHT: 147 LBS | HEART RATE: 100 BPM | OXYGEN SATURATION: 97 % | HEIGHT: 63 IN | SYSTOLIC BLOOD PRESSURE: 108 MMHG | DIASTOLIC BLOOD PRESSURE: 70 MMHG

## 2025-03-03 DIAGNOSIS — I10 ESSENTIAL HYPERTENSION: ICD-10-CM

## 2025-03-03 DIAGNOSIS — R74.8 ABNORMAL CARDIAC ENZYME LEVEL: Primary | ICD-10-CM

## 2025-03-03 PROCEDURE — 1159F MED LIST DOCD IN RCRD: CPT | Performed by: INTERNAL MEDICINE

## 2025-03-03 PROCEDURE — 3074F SYST BP LT 130 MM HG: CPT | Performed by: INTERNAL MEDICINE

## 2025-03-03 PROCEDURE — 1160F RVW MEDS BY RX/DR IN RCRD: CPT | Performed by: INTERNAL MEDICINE

## 2025-03-03 PROCEDURE — 99213 OFFICE O/P EST LOW 20 MIN: CPT | Performed by: INTERNAL MEDICINE

## 2025-03-03 PROCEDURE — 3078F DIAST BP <80 MM HG: CPT | Performed by: INTERNAL MEDICINE

## 2025-03-03 RX ORDER — ASPIRIN 81 MG/1
81 TABLET ORAL DAILY
COMMUNITY

## 2025-03-03 NOTE — PROGRESS NOTES
Subjective:     Encounter Date:03/03/2025      Patient ID: Ashley Emmanuel is a 79 y.o. female.    Chief Complaint: Syncope  HPI  This is a 79-year-old female patient who presents to cardiology clinic to discuss results of outpatient testing after having an episode of syncope with mild elevation and cardiac troponin.  There was no initial evidence of acute coronary syndrome.  The patient underwent an echocardiogram which was normal. The echocardiogram showed no evidence of ventricular hypertrophy or cardiac chamber enlargement.  Left ventricular systolic and diastolic function was normal.  There was no evidence of valvular pathology of significance, pericardial disease, pulmonary hypertension or intracardiac shunting.  The left ventricular ejection fraction was normal and there were no regional wall motion abnormalities.  The patient underwent a vasodilator nuclear stress test showing no evidence of ischemia or infarction.  The calculated ejection fraction was normal.  The patient wore an outpatient cardiac monitor showing no evidence of arrhythmia.  There was no tachycardia or bradycardia which would have explain syncope.  She has had no recurrent syncope.  She is asymptomatic from a cardiovascular perspective.  She reports compliance with her medications with no perceived side effects.  She remains a non-smoker.  The following portions of the patient's history were reviewed and updated as appropriate: allergies, current medications, past family history, past medical history, past social history, past surgical history and problem  Review of Systems   Constitutional: Negative for chills, diaphoresis, fever, malaise/fatigue, weight gain and weight loss.   HENT:  Negative for ear discharge, hearing loss, hoarse voice and nosebleeds.    Eyes:  Negative for discharge, double vision, pain and photophobia.   Cardiovascular:  Negative for chest pain, claudication, cyanosis, dyspnea on exertion, irregular heartbeat, leg  swelling, near-syncope, orthopnea, palpitations, paroxysmal nocturnal dyspnea and syncope.   Respiratory:  Negative for cough, hemoptysis, sputum production and wheezing.    Endocrine: Negative for cold intolerance, heat intolerance, polydipsia, polyphagia and polyuria.   Hematologic/Lymphatic: Negative for adenopathy and bleeding problem. Does not bruise/bleed easily.   Skin:  Negative for color change, flushing, itching and rash.   Musculoskeletal:  Negative for muscle cramps, muscle weakness, myalgias and stiffness.   Gastrointestinal:  Negative for abdominal pain, diarrhea, hematemesis, hematochezia, nausea and vomiting.   Genitourinary:  Negative for dysuria, frequency and nocturia.   Neurological:  Negative for focal weakness, loss of balance, numbness, paresthesias and seizures.   Psychiatric/Behavioral:  Negative for altered mental status, hallucinations and suicidal ideas.    Allergic/Immunologic: Negative for HIV exposure, hives and persistent infections.           Current Outpatient Medications:     Accu-Chek Guide test strip, 1 each by Other route As Needed., Disp: , Rfl:     Accu-Chek Softclix Lancets lancets, 1 each by Other route Daily., Disp: , Rfl:     aspirin 81 MG EC tablet, Take 1 tablet by mouth Daily., Disp: , Rfl:     atorvastatin (LIPITOR) 40 MG tablet, Take 1 tablet by mouth Every Night., Disp: , Rfl:     Blood Glucose Monitoring Suppl (Accu-Chek Guide) w/Device kit, 1 each by Other route Daily. use to test once daily, Disp: , Rfl:     CALCIUM PO, Take  by mouth., Disp: , Rfl:     hydroCHLOROthiazide 25 MG tablet, Take 1 tablet by mouth Daily., Disp: , Rfl:     levothyroxine (SYNTHROID, LEVOTHROID) 50 MCG tablet, Take 1 tablet by mouth Every Morning., Disp: , Rfl:     ramipril (ALTACE) 10 MG capsule, Take 1 capsule by mouth Daily., Disp: , Rfl:     Objective:   Vitals and nursing note reviewed.   Constitutional:       Appearance: Healthy appearance. Not in distress.   Neck:      Vascular: No  "JVR. JVD normal.   Pulmonary:      Effort: Pulmonary effort is normal.      Breath sounds: Normal breath sounds. No wheezing. No rhonchi. No rales.   Chest:      Chest wall: Not tender to palpatation.   Cardiovascular:      PMI at left midclavicular line. Normal rate. Regular rhythm. Normal S1. Normal S2.       Murmurs: There is no murmur.      No gallop.  No click. No rub.   Pulses:     Intact distal pulses.   Edema:     Peripheral edema absent.   Abdominal:      General: Bowel sounds are normal.      Palpations: Abdomen is soft.      Tenderness: There is no abdominal tenderness.   Musculoskeletal: Normal range of motion.         General: No tenderness. Skin:     General: Skin is warm and dry.   Neurological:      General: No focal deficit present.      Mental Status: Alert and oriented to person, place and time.       Blood pressure 108/70, pulse 100, height 160 cm (63\"), weight 66.7 kg (147 lb), SpO2 97%.   Lab Review:     Assessment:       1. Abnormal cardiac enzyme level  No evidence of acute coronary syndrome.  In retrospect this is almost certainly a nonthrombotic biomarker event/type II myocardial injury related to syncope/nonischemic, nontraumatic myocardial injury.  No evidence of ischemic heart disease.  Structurally normal heart.    2. Essential hypertension  Acceptable blood pressure control.    Procedures    Plan:     Advance Care Planning   ACP discussion was held with the patient during this visit. Patient has an advance directive (not in EMR), copy requested.     The patient has been reassured regarding the benign nature of her cardiac test results.    No further cardiovascular testing is warranted.    No change in medications made at today's visit.      "

## 2025-04-30 NOTE — ED PROVIDER NOTES
[facility] 3  Emergency Department Encounter  Emergency Medicine Physician Note       Pt Name: Ashley Emmanuel  MRN: [id number]  Pt :   1945  Room Number:  308/1  Date of encounter:  2024  PCP: Rossy Espinosa MD  ED Provider: Pk Kiser MD    Historian: Patient      HPI:  Chief Complaint: Syncope        Context: Ashley Emmanuel is a 78 y.o. female who presents to the ED for syncope.  Patient reports she was standing and talking with friends when she felt lightheaded and dizzy and had associated nausea.  She then passed out and does not recall all of the events fully.  She was noted to be bradycardic by EMS.  Upon arrival to the emergency department she reports her symptoms have improved.  No chest pain or shortness of breath.  No leg swelling.  She cannot recall all of the medications that she is on.  She reports a similar episode happened a few years back.      PAST MEDICAL HISTORY  Past Medical History:   Diagnosis Date    Disease of thyroid gland     Hypertension          PAST SURGICAL HISTORY  Past Surgical History:   Procedure Laterality Date    TONSILLECTOMY      TUBAL ABDOMINAL LIGATION           FAMILY HISTORY  History reviewed. No pertinent family history.      SOCIAL HISTORY  Social History     Socioeconomic History    Marital status:    Tobacco Use    Smoking status: Never   Substance and Sexual Activity    Alcohol use: Yes     Comment: occasionally    Drug use: No         ALLERGIES  Penicillins        REVIEW OF SYSTEMS  Systems reviewed and negative      PHYSICAL EXAM    I have reviewed the triage vital signs and nursing notes.    ED Triage Vitals [24 1121]   Temp Heart Rate Resp BP SpO2   97.4 °F (36.3 °C) (!) 49 16 108/60 94 %      Temp src Heart Rate Source Patient Position BP Location FiO2 (%)   -- Monitor Sitting -- --       Physical Exam  Constitutional:       General: She is not in acute distress.  HENT:      Head: Atraumatic.    Eyes:      Extraocular Movements: Extraocular movements intact.      Pupils: Pupils are equal, round, and reactive to light.   Cardiovascular:      Rate and Rhythm: Regular rhythm. Bradycardia present.   Pulmonary:      Effort: Pulmonary effort is normal. No respiratory distress.      Breath sounds: No wheezing or rales.   Abdominal:      General: There is no distension.      Palpations: Abdomen is soft.   Musculoskeletal:      Cervical back: Neck supple.      Right lower leg: No edema.      Left lower leg: No edema.   Skin:     General: Skin is warm.   Neurological:      General: No focal deficit present.      Mental Status: She is alert.         LAB RESULTS  Recent Results (from the past 24 hours)   Comprehensive Metabolic Panel    Collection Time: 11/07/24 11:33 AM    Specimen: Blood   Result Value Ref Range    Glucose 153 (H) 65 - 99 mg/dL    BUN 25 (H) 8 - 23 mg/dL    Creatinine 1.01 (H) 0.57 - 1.00 mg/dL    Sodium 136 136 - 145 mmol/L    Potassium 3.1 (L) 3.5 - 5.2 mmol/L    Chloride 98 98 - 107 mmol/L    CO2 24.8 22.0 - 29.0 mmol/L    Calcium 9.5 8.6 - 10.5 mg/dL    Total Protein 6.6 6.0 - 8.5 g/dL    Albumin 4.0 3.5 - 5.2 g/dL    ALT (SGPT) 13 1 - 33 U/L    AST (SGOT) 17 1 - 32 U/L    Alkaline Phosphatase 68 39 - 117 U/L    Total Bilirubin 0.5 0.0 - 1.2 mg/dL    Globulin 2.6 gm/dL    A/G Ratio 1.5 g/dL    BUN/Creatinine Ratio 24.8 7.0 - 25.0    Anion Gap 13.2 5.0 - 15.0 mmol/L    eGFR 57.1 (L) >60.0 mL/min/1.73   Magnesium    Collection Time: 11/07/24 11:33 AM    Specimen: Blood   Result Value Ref Range    Magnesium 1.8 1.6 - 2.4 mg/dL   Single High Sensitivity Troponin T    Collection Time: 11/07/24 11:33 AM    Specimen: Blood   Result Value Ref Range    HS Troponin T 37 (H) <14 ng/L   TSH    Collection Time: 11/07/24 11:33 AM    Specimen: Blood   Result Value Ref Range    TSH 4.440 (H) 0.270 - 4.200 uIU/mL   Green Top (Gel)    Collection Time: 11/07/24 11:33 AM   Result Value Ref Range    Extra Tube Hold  for add-ons.    Lavender Top    Collection Time: 11/07/24 11:33 AM   Result Value Ref Range    Extra Tube hold for add-on    Gold Top - SST    Collection Time: 11/07/24 11:33 AM   Result Value Ref Range    Extra Tube Hold for add-ons.    Light Blue Top    Collection Time: 11/07/24 11:33 AM   Result Value Ref Range    Extra Tube Hold for add-ons.    CBC Auto Differential    Collection Time: 11/07/24 11:33 AM    Specimen: Blood   Result Value Ref Range    WBC 6.81 3.40 - 10.80 10*3/mm3    RBC 4.33 3.77 - 5.28 10*6/mm3    Hemoglobin 13.7 12.0 - 15.9 g/dL    Hematocrit 39.9 34.0 - 46.6 %    MCV 92.1 79.0 - 97.0 fL    MCH 31.6 26.6 - 33.0 pg    MCHC 34.3 31.5 - 35.7 g/dL    RDW 13.2 12.3 - 15.4 %    RDW-SD 44.7 37.0 - 54.0 fl    MPV 8.9 6.0 - 12.0 fL    Platelets 301 140 - 450 10*3/mm3    Neutrophil % 64.8 42.7 - 76.0 %    Lymphocyte % 21.1 19.6 - 45.3 %    Monocyte % 9.5 5.0 - 12.0 %    Eosinophil % 3.1 0.3 - 6.2 %    Basophil % 1.2 0.0 - 1.5 %    Immature Grans % 0.3 0.0 - 0.5 %    Neutrophils, Absolute 4.41 1.70 - 7.00 10*3/mm3    Lymphocytes, Absolute 1.44 0.70 - 3.10 10*3/mm3    Monocytes, Absolute 0.65 0.10 - 0.90 10*3/mm3    Eosinophils, Absolute 0.21 0.00 - 0.40 10*3/mm3    Basophils, Absolute 0.08 0.00 - 0.20 10*3/mm3    Immature Grans, Absolute 0.02 0.00 - 0.05 10*3/mm3    nRBC 0.0 0.0 - 0.2 /100 WBC   T4, Free    Collection Time: 11/07/24 11:33 AM    Specimen: Blood   Result Value Ref Range    Free T4 1.71 (H) 0.92 - 1.68 ng/dL   Single High Sensitivity Troponin T    Collection Time: 11/07/24 12:36 PM    Specimen: Blood   Result Value Ref Range    HS Troponin T 36 (H) <14 ng/L       If labs were ordered, I independently reviewed the results and considered them in treating the patient.        RADIOLOGY  XR Chest 1 View    Result Date: 11/7/2024  PROCEDURE: XR CHEST 1 VW-  HISTORY: Dysrhythmia Triage Protocol  COMPARISON: March 21, 2019..  FINDINGS: The heart is normal in size. The lungs are clear. The  mediastinum is unremarkable. There is no pneumothorax.  There are no acute osseous abnormalities. There is evidence of old calcified granulomatous disease. Apical lordotic positioning noted. Left lateral basilar density is stable.      No acute cardiopulmonary process.     This report was signed and finalized on 11/7/2024 12:19 PM by Elizabeth Chau MD.       PROCEDURES    Procedures    ECG 12 Lead ED Triage Standing Order; Dysrhythmia   Final Result          MEDICATIONS GIVEN IN ER    Medications   sodium chloride 0.9 % flush 10 mL (has no administration in time range)   potassium chloride (KLOR-CON M20) CR tablet 40 mEq (40 mEq Oral Given 11/7/24 1232)         MEDICAL DECISION MAKING, PROGRESS, and CONSULTS    All labs, if obtained, have been independently reviewed by me.  All radiology studies, if obtained, have been reviewed by me and the radiologist dictating the report.  All EKG's, if obtained, have been independently viewed and interpreted by me.      Discussion below represents my analysis of pertinent findings related to patient's condition, differential diagnosis, treatment plan and final disposition.                         Differential diagnosis:    Syncope, arrhythmia, electrolyte abnormality, thyroid disorder, renal failure, ACS, others.      Additional sources:    - Discussed/ obtained information from independent historians:      - External (non-ED) record review: Outside ED note from 12/1/2022    - Chronic or social conditions impacting care:      - Shared decision making:        Orders placed during this visit:  Orders Placed This Encounter   Procedures    XR Chest 1 View    [place] Draw    Comprehensive Metabolic Panel    Magnesium    Single High Sensitivity Troponin T    TSH    CBC Auto Differential    T4, Free    Single High Sensitivity Troponin T    Diet: Regular/House; Fluid Consistency: Thin (IDDSI 0)    Undress & Gown    Continuous Pulse Oximetry    Oxygen Therapy- Nasal Cannula; Titrate 1-6  LPM Per SpO2; 90 - 95%    ECG 12 Lead ED Triage Standing Order; Dysrhythmia    Insert Peripheral IV    Inpatient Admission    CBC & Differential    Green Top (Gel)    Lavender Top    Gold Top - SST    Light Blue Top         Additional orders considered but not ordered:      ED Course/MDM Discussion:    Patient is a 78-year-old female who presented for evaluation of syncopal episode.  This occurred just prior to arrival.  On arrival to the emergency department she was bradycardic which was also noted by EMS.  Her blood pressure is mildly low.  On chart review patient is on Coreg.  EKG demonstrated sinus bradycardia with left bundle branch block.  No recent EKGs in the system.  Troponin was elevated however no significant delta change.  Electrolytes demonstrate mild hypokalemia.  Patient is on hydrochlorothiazide on chart review.  This was supplemented.  She also has history of hypothyroidism TSH was noted to be mildly elevated however T4 is appropriate.  Chest x-ray demonstrated no overt pulmonary edema on my interpretation of imaging.  She remains bradycardic here in the emergency department though she does report some symptom improvement.  Given symptomatic sinus bradycardia with elevated troponin, left bundle branch block of unknown chronicity, recommended observation admission to patient which she is agreeable to.                    Consultants:    Hospitalist    Shared Decision Making:  After my consideration of clinical presentation and any laboratory/radiology studies obtained, I discussed the findings with the patient/patient representative who is in agreement with the treatment plan and the final disposition.   Risks and benefits of discharge and/or observation/admission were discussed.       AS OF 16:10 EST VITALS:    BP - 138/68  HR - 62  TEMP - 97.3 °F (36.3 °C) (Oral)  O2 SATS - 95%                  DIAGNOSIS  Final diagnoses:   Sinus bradycardia   Syncope, unspecified syncope type          DISPOSITION  ED Disposition       ED Disposition   Decision to Admit    Condition   --    Comment   Level of Care: Telemetry [5]   Diagnosis: Bradycardia [120908]   Certification: I Certify That Inpatient Hospital Services Are Medically Necessary For Greater Than 2 Midnights                     Please note that portions of this document were completed with voice recognition software.        Pk Kiser MD  11/07/24 3634     not applicable (Male)